# Patient Record
Sex: FEMALE | Race: BLACK OR AFRICAN AMERICAN | Employment: OTHER
[De-identification: names, ages, dates, MRNs, and addresses within clinical notes are randomized per-mention and may not be internally consistent; named-entity substitution may affect disease eponyms.]

---

## 2024-07-17 RX ORDER — ASPIRIN 81 MG/1
81 TABLET ORAL DAILY
COMMUNITY

## 2024-07-17 RX ORDER — EZETIMIBE 10 MG/1
10 TABLET ORAL DAILY
COMMUNITY

## 2024-07-17 RX ORDER — OMEPRAZOLE 20 MG/1
20 CAPSULE, DELAYED RELEASE ORAL DAILY
COMMUNITY

## 2024-07-17 RX ORDER — M-VIT,TX,IRON,MINS/CALC/FOLIC 27MG-0.4MG
1 TABLET ORAL DAILY
COMMUNITY

## 2024-07-17 RX ORDER — ATORVASTATIN CALCIUM 80 MG/1
80 TABLET, FILM COATED ORAL DAILY
COMMUNITY

## 2024-07-18 ENCOUNTER — HOSPITAL ENCOUNTER (OUTPATIENT)
Facility: HOSPITAL | Age: 74
Discharge: HOME | End: 2024-07-18
Payer: MEDICARE

## 2024-07-18 VITALS
OXYGEN SATURATION: 100 % | HEIGHT: 61 IN | SYSTOLIC BLOOD PRESSURE: 135 MMHG | DIASTOLIC BLOOD PRESSURE: 78 MMHG | TEMPERATURE: 97.7 F | RESPIRATION RATE: 18 BRPM | WEIGHT: 169.75 LBS | BODY MASS INDEX: 32.05 KG/M2 | HEART RATE: 68 BPM

## 2024-07-18 LAB
ABO + RH BLD: NORMAL
ALBUMIN SERPL-MCNC: 4 G/DL (ref 3.5–5)
ALBUMIN/GLOB SERPL: 1.1 (ref 1.1–2.2)
ALP SERPL-CCNC: 90 U/L (ref 45–117)
ALT SERPL-CCNC: 34 U/L (ref 12–78)
ANION GAP SERPL CALC-SCNC: 4 MMOL/L (ref 5–15)
APPEARANCE UR: CLEAR
APTT PPP: 24.6 SEC (ref 22.1–31)
AST SERPL-CCNC: 40 U/L (ref 15–37)
BACTERIA URNS QL MICRO: NEGATIVE /HPF
BASOPHILS # BLD: 0 K/UL (ref 0–0.1)
BASOPHILS NFR BLD: 1 % (ref 0–1)
BILIRUB SERPL-MCNC: 0.6 MG/DL (ref 0.2–1)
BILIRUB UR QL: NEGATIVE
BLOOD GROUP ANTIBODIES SERPL: NORMAL
BUN SERPL-MCNC: 16 MG/DL (ref 6–20)
BUN/CREAT SERPL: 20 (ref 12–20)
CALCIUM SERPL-MCNC: 10 MG/DL (ref 8.5–10.1)
CHLORIDE SERPL-SCNC: 110 MMOL/L (ref 97–108)
CO2 SERPL-SCNC: 25 MMOL/L (ref 21–32)
COLOR UR: ABNORMAL
CREAT SERPL-MCNC: 0.82 MG/DL (ref 0.55–1.02)
DIFFERENTIAL METHOD BLD: ABNORMAL
EKG ATRIAL RATE: 52 BPM
EKG DIAGNOSIS: NORMAL
EKG P AXIS: 19 DEGREES
EKG P-R INTERVAL: 174 MS
EKG Q-T INTERVAL: 440 MS
EKG QRS DURATION: 78 MS
EKG QTC CALCULATION (BAZETT): 409 MS
EKG R AXIS: -11 DEGREES
EKG T AXIS: 151 DEGREES
EKG VENTRICULAR RATE: 52 BPM
EOSINOPHIL # BLD: 0.1 K/UL (ref 0–0.4)
EOSINOPHIL NFR BLD: 2 % (ref 0–7)
EPITH CASTS URNS QL MICRO: ABNORMAL /LPF
ERYTHROCYTE [DISTWIDTH] IN BLOOD BY AUTOMATED COUNT: 13.6 % (ref 11.5–14.5)
EST. AVERAGE GLUCOSE BLD GHB EST-MCNC: 108 MG/DL
GLOBULIN SER CALC-MCNC: 3.6 G/DL (ref 2–4)
GLUCOSE SERPL-MCNC: 88 MG/DL (ref 65–100)
GLUCOSE UR STRIP.AUTO-MCNC: NEGATIVE MG/DL
HBA1C MFR BLD: 5.4 % (ref 4–5.6)
HCT VFR BLD AUTO: 39.9 % (ref 35–47)
HGB BLD-MCNC: 12.6 G/DL (ref 11.5–16)
HGB UR QL STRIP: NEGATIVE
IMM GRANULOCYTES # BLD AUTO: 0 K/UL (ref 0–0.04)
IMM GRANULOCYTES NFR BLD AUTO: 0 % (ref 0–0.5)
INR PPP: 1 (ref 0.9–1.1)
KETONES UR QL STRIP.AUTO: NEGATIVE MG/DL
LEUKOCYTE ESTERASE UR QL STRIP.AUTO: ABNORMAL
LYMPHOCYTES # BLD: 2.2 K/UL (ref 0.8–3.5)
LYMPHOCYTES NFR BLD: 52 % (ref 12–49)
MCH RBC QN AUTO: 29 PG (ref 26–34)
MCHC RBC AUTO-ENTMCNC: 31.6 G/DL (ref 30–36.5)
MCV RBC AUTO: 91.7 FL (ref 80–99)
MONOCYTES # BLD: 0.4 K/UL (ref 0–1)
MONOCYTES NFR BLD: 9 % (ref 5–13)
NEUTS SEG # BLD: 1.5 K/UL (ref 1.8–8)
NEUTS SEG NFR BLD: 36 % (ref 32–75)
NITRITE UR QL STRIP.AUTO: NEGATIVE
NRBC # BLD: 0 K/UL (ref 0–0.01)
NRBC BLD-RTO: 0 PER 100 WBC
PH UR STRIP: 6 (ref 5–8)
PLATELET # BLD AUTO: 232 K/UL (ref 150–400)
PMV BLD AUTO: 9.7 FL (ref 8.9–12.9)
POTASSIUM SERPL-SCNC: 3.9 MMOL/L (ref 3.5–5.1)
PROT SERPL-MCNC: 7.6 G/DL (ref 6.4–8.2)
PROT UR STRIP-MCNC: NEGATIVE MG/DL
PROTHROMBIN TIME: 10.5 SEC (ref 9–11.1)
RBC # BLD AUTO: 4.35 M/UL (ref 3.8–5.2)
RBC #/AREA URNS HPF: ABNORMAL /HPF (ref 0–5)
SODIUM SERPL-SCNC: 139 MMOL/L (ref 136–145)
SP GR UR REFRACTOMETRY: 1.01
SPECIMEN EXP DATE BLD: NORMAL
THERAPEUTIC RANGE: NORMAL SECS (ref 58–77)
URINE CULTURE IF INDICATED: ABNORMAL
UROBILINOGEN UR QL STRIP.AUTO: 1 EU/DL (ref 0.2–1)
WBC # BLD AUTO: 4.3 K/UL (ref 3.6–11)
WBC URNS QL MICRO: ABNORMAL /HPF (ref 0–4)

## 2024-07-18 PROCEDURE — 86850 RBC ANTIBODY SCREEN: CPT

## 2024-07-18 PROCEDURE — 86901 BLOOD TYPING SEROLOGIC RH(D): CPT

## 2024-07-18 PROCEDURE — 97530 THERAPEUTIC ACTIVITIES: CPT

## 2024-07-18 PROCEDURE — 83036 HEMOGLOBIN GLYCOSYLATED A1C: CPT

## 2024-07-18 PROCEDURE — 36415 COLL VENOUS BLD VENIPUNCTURE: CPT

## 2024-07-18 PROCEDURE — 97161 PT EVAL LOW COMPLEX 20 MIN: CPT

## 2024-07-18 PROCEDURE — 80053 COMPREHEN METABOLIC PANEL: CPT

## 2024-07-18 PROCEDURE — 86900 BLOOD TYPING SEROLOGIC ABO: CPT

## 2024-07-18 PROCEDURE — 85610 PROTHROMBIN TIME: CPT

## 2024-07-18 PROCEDURE — APPNB30 APP NON BILLABLE TIME 0-30 MINS: Performed by: NURSE PRACTITIONER

## 2024-07-18 PROCEDURE — 85025 COMPLETE CBC W/AUTO DIFF WBC: CPT

## 2024-07-18 PROCEDURE — 81001 URINALYSIS AUTO W/SCOPE: CPT

## 2024-07-18 PROCEDURE — 93005 ELECTROCARDIOGRAM TRACING: CPT | Performed by: NEUROLOGICAL SURGERY

## 2024-07-18 PROCEDURE — 85730 THROMBOPLASTIN TIME PARTIAL: CPT

## 2024-07-18 RX ORDER — SODIUM CHLORIDE, SODIUM LACTATE, POTASSIUM CHLORIDE, CALCIUM CHLORIDE 600; 310; 30; 20 MG/100ML; MG/100ML; MG/100ML; MG/100ML
INJECTION, SOLUTION INTRAVENOUS CONTINUOUS
Status: CANCELLED | OUTPATIENT
Start: 2024-07-24

## 2024-07-18 RX ORDER — ACETAMINOPHEN 500 MG
1000 TABLET ORAL ONCE
Status: CANCELLED | OUTPATIENT
Start: 2024-07-24

## 2024-07-18 RX ORDER — PREGABALIN 150 MG/1
150 CAPSULE ORAL ONCE
Status: CANCELLED | OUTPATIENT
Start: 2024-07-24

## 2024-07-18 RX ORDER — CALCIUM CARBONATE 500(1250)
500 TABLET ORAL DAILY
COMMUNITY

## 2024-07-18 ASSESSMENT — PAIN DESCRIPTION - ORIENTATION: ORIENTATION: LEFT;RIGHT

## 2024-07-18 ASSESSMENT — PAIN DESCRIPTION - LOCATION: LOCATION: BACK;LEG

## 2024-07-18 ASSESSMENT — PAIN DESCRIPTION - DESCRIPTORS: DESCRIPTORS: ACHING;NUMBNESS;SHOOTING

## 2024-07-18 ASSESSMENT — PAIN DESCRIPTION - FREQUENCY: FREQUENCY: CONTINUOUS

## 2024-07-18 ASSESSMENT — PAIN DESCRIPTION - ONSET: ONSET: AWAKENED FROM SLEEP

## 2024-07-18 ASSESSMENT — PAIN SCALES - GENERAL: PAINLEVEL_OUTOF10: 3

## 2024-07-19 LAB
BACTERIA SPEC CULT: NORMAL
BACTERIA SPEC CULT: NORMAL
SERVICE CMNT-IMP: NORMAL

## 2024-07-22 NOTE — PROGRESS NOTES
[facility]  Joint/Spine Preoperative Instructions        Surgery Date 07/24/24          Time of Arrival to be called on 07/23/24 after 2 pm  Contact: [phone]   1. On the day of your surgery, please report to the Surgical Services Registration Desk and sign in at your designated time. The Surgery Center is located to the right of the Emergency Room.     2. You must have someone with you to drive you home. You should not drive a car for 24 hours following surgery. Please make arrangements for a friend or family member to stay with you for the first 24 hours after your surgery.    3. No food after midnight 07/23/24.  Medications morning of surgery should be taken with a sip of water.  Please follow pre-surgery drink instructions that were given at your Pre Admission Testing appointment.      4. We recommend you do not drink any alcoholic beverages for 24 hours before and after your surgery.    5. Contact your surgeon’s office for instructions on the following medications: non-steroidal anti-inflammatory drugs (i.e. Advil, Aleve), vitamins, and supplements. (Some surgeon’s will want you to stop these medications prior to surgery and others may allow you to take them)  **If you are currently taking Plavix, Coumadin, Aspirin and/or other blood-thinning agents, contact your surgeon for instructions.** Your surgeon will partner with the physician prescribing these medications to determine if it is safe to stop or if you need to continue taking.  Please do not stop taking these medications without instructions from your surgeon    6. Wear comfortable clothes.  Wear glasses instead of contacts.  Do not bring any money or jewelry. Please bring picture ID, insurance card, and any prearranged co-payment or hospital payment.  Do not wear make-up, particularly mascara the morning of your surgery.  Do not wear nail polish, particularly if you are having foot /hand surgery.  Wear your hair 
Hibiclens/Chlorhexidine    Preventing Infections Before and After - Your Surgery    IMPORTANT INSTRUCTIONS    Please read and follow these instructions carefully. If you are unable to comply with the below instructions your procedure will be cancelled.       Every Night for Three (3) nights before your surgery:  Shower with an antibacterial soap, such as Dial, or the soap provided at your preassessment appointment. A shower is better than a bath for cleaning your skin.  If needed, ask someone to help you reach all areas of your body. Don’t forget to clean your belly button with every shower.    The night before your surgery:   If you lose your Hibiclens/chlorhexidine please contact surgery center or you can purchase it at a local pharmacy  On the night before your surgery, shower with an antibacterial soap, such as Dial, or the soap provided at your preassessment appointment.   With bottle of Hibiclens/Chlorhexidine in hand, turn water off.  Apply Hibiclens antiseptic skin cleanser with a clean, freshly washed washcloth.  Gently apply to your body from chin to toes (except the genital area) and especially the area(s) where your incision(s) will be.  Leave Hibiclens/Chlorhexidine on your skin for at least 20 seconds.    CAUTION: If needed, Hibiclens/chlorhexidine may be used to clean the folds of skin of the legs (such as in the area of the groin) and on your buttocks and hips. However, do not use Hibiclens/Chlorhexidine above the neck or in the genital area (your bottom) or put inside any area of your body.  Turn the water back on and rinse.  Dry gently with a clean, freshly washed towel.  After your shower, do not use any powder, deodorant, perfumes or lotion.  Use clean, freshly washed towels and washcloths every time you shower.  Wear clean, freshly washed pajamas to bed the night before surgery.  Sleep on clean, freshly washed sheets.  Do not allow pets to sleep in your bed with you.        The Morning of your 
Incentive Spirometer        Using the incentive spirometer helps expand the small air sacs of your lungs, helps you breathe deeply, and helps improve your lung function.  Use your incentive spirometer twice a day (10 breaths each time) prior to surgery.      How to Use Your Incentive Spirometer:  Hold the incentive spirometer in an upright position.   Breathe out as usual.   Place the mouthpiece in your mouth and seal your lips tightly around it.   Take a deep breath.  Breathe in slowly and as deeply as possible. Keep the blue flow rate guide between the arrows.   Hold your breath as long as possible. Then exhale slowly and allow the piston to fall to the bottom of the column.   Rest for a few seconds and repeat steps one through five at least 10 times.     PAT Tidal Volume________2000-2500__________  x_______2_________  Date________7/18/24_______________    BRING THE INCENTIVE SPIROMETER WITH YOU TO THE HOSPITAL ON THE DAY OF YOUR SURGERY.  Opportunity given to ask and answer questions as well as to observe return demonstration.    Patient signature_____________________________    Witness____________________________  
Orthopedic and Spine Patients:  Instructions on When You Can   Eat or Drink Before Surgery      You have been provided 2 pre-surgery drinks received at your pre-admission testing appointment.    Night before surgery:  You should drink one bottle of the  pre-surgery drink at bedtime. No food after midnight!    Day of Surgery:  Complete 2nd bottle of the pre-surgery drink 1 hour prior to arrival at hospital.  For questions call Pre-Admission Testing at [phone].  They are available from 8:00am-5:00pm, Monday through Friday.  
Spine book given and reviewed with patient and opportunity provided to  answer questions  
  ____________________________________________  PAST SURGICAL HISTORY  Past Surgical History:   Procedure Laterality Date    DILATION AND CURETTAGE OF UTERUS      for bleeding for 3 months    LAP BAND      placed and removed      ____________________________________________  HOME MEDICATIONS    Current Outpatient Medications   Medication Sig    linaclotide (LINZESS) 145 MCG capsule Take 1 capsule by mouth as needed    vitamin D 25 MCG (1000 UT) CAPS Take by mouth daily    calcium carbonate (OSCAL) 500 MG TABS tablet Take 1 tablet by mouth daily    Multiple Vitamins-Minerals (THERAPEUTIC MULTIVITAMIN-MINERALS) tablet Take 1 tablet by mouth daily    omeprazole (PRILOSEC) 20 MG delayed release capsule Take 1 capsule by mouth daily    aspirin 81 MG EC tablet Take 1 tablet by mouth daily    ezetimibe (ZETIA) 10 MG tablet Take 1 tablet by mouth daily    atorvastatin (LIPITOR) 80 MG tablet Take 1 tablet by mouth daily     No current facility-administered medications for this encounter.      ____________________________________________  ALLERGIES  No Known Allergies   ____________________________________________  SOCIAL HISTORY  Social History     Tobacco Use    Smoking status: Former     Types: Cigarettes     Start date:      Quit date: 1970     Years since quittin.5    Smokeless tobacco: Never   Substance Use Topics    Alcohol use: Yes     Comment: occasional      ____________________________________________       Labs:     Hospital Outpatient Visit on 2024   Component Date Value Ref Range Status    WBC 2024 4.3  3.6 - 11.0 K/uL Final    RBC 2024 4.35  3.80 - 5.20 M/uL Final    Hemoglobin 2024 12.6  11.5 - 16.0 g/dL Final    Hematocrit 2024 39.9  35.0 - 47.0 % Final    MCV 2024 91.7  80.0 - 99.0 FL Final    MCH 2024 29.0  26.0 - 34.0 PG Final    MCHC 2024 31.6  30.0 - 36.5 g/dL Final    RDW 2024 13.6  11.5 - 14.5 % Final    Platelets 2024 232  150 - 400 
buttocks, radiates to knees     Activity Tolerance:   Good    Patient does not state signs/symptoms of shortness of breath/dyspnea on exertion/respiratory distress.  COMMUNICATION OF ABOVE EDUCATION:     The patient's plan of care was discussed as follows:   [x]         The patient verbalized understanding of her plan in preparation for their upcoming surgery  []         The patient's  was present for this session  []        The patient reports that he/she does not have a  identified at this time  []         The  verbalized understanding of the education regarding the patient's upcoming surgery  [x]         Patient/family agree to work toward stated goals and plan of care.  []         Patient understands intent and goals of therapy, but is neutral about his/her participation.  []         Patient is unable to participate in goal setting and plan of care.      Thank you for this referral.  Radha De Leon, PT      Time  In / Out       Total Treatment Time     Total Timed Codes     1:1 Treatment Time        [facility] Totals Reminder:  bill using total billable   min of TIMED therapeutic procedures and modalities.   8-22 min = 1 unit; 23-37 min = 2 units; 38-52 min = 3 units;  53-67 min = 4 units; 68-82 min = 5 units     Patient  verified yes     Visit #   Current  / Total 1 1         Physical Therapy Evaluation Charge Determination   History Examination Presentation Decision-Making   MEDIUM  Complexity : 1-2 comorbidities / personal factors will impact the outcome/ POC  MEDIUM Complexity : 3 Standardized tests and measures addressin body structure, function, activity limitation and / or participation in recreation  LOW Complexity : Stable, uncomplicated  AM-PAC  LOW      Based on the above components, the patient evaluation is determined to be of the following complexity level: Low

## 2024-07-23 NOTE — DISCHARGE INSTRUCTIONS
SPINE DISCHARGE  INSTRUCTIONS    Antonio Mobley M.D.      What can I do?  The only exercise you should do is walking.  Start by walking twice a day for 5 minutes, then increase by  2-3 minutes every day until you reach 25 minutes twice a day.  DO NOT sit for long periods of time (20 minutes at a time for the first couple of weeks, then gradually increase.  No heavy lifting (5 lbs max); no straining, twisting, or bending.  No driving until your physician tells you it is ok.  It is, however, ok to ride short distances in a car.  If you are required to wear a back brace, you may remove it when you are sleeping unless your doctor has advised against it.  If you are required to wear a cervical collar, you must sleep in it.  You can remove it only for showers.    What can I eat?   You may resume your regular home diet as tolerated.  If your throat is sore, you may want to eat soft food for the first few days.    When can I take a shower?  You may shower leaving on your occlusive (waterproof) dressing on allowing water to run over the dressing.  The dressing may be removed in 5 days.  The small pieces of tape (steri strips)  underneath it should stay on.  Let water run over them, then pat dry gently.  Do not take baths or soak in pools.  You may remove your brace for showering.    Medications:  Check with your physician before taking any anti-inflammatory medicines (Advil, Aleve, ibuprofen, aspirin).  Take prescription medicine as directed.  DO NOT take more than prescribed.  Call your physician if the prescribed dose is not sufficiently controlling your pain.  It is important to have regular bowel movements.  Pain medications may cause constipation.  Drink plenty of fluids, get enough fiber in your diet, and use stool softeners and drink prune juice to help prevent constipation.  Do not take laxatives if at all possible except in severe situations.  It can result in a vicious cycle of constipation and diarrhea.  Do not put

## 2024-07-24 ENCOUNTER — ANESTHESIA EVENT (OUTPATIENT)
Facility: HOSPITAL | Age: 74
End: 2024-07-24
Payer: MEDICARE

## 2024-07-24 ENCOUNTER — APPOINTMENT (OUTPATIENT)
Facility: HOSPITAL | Age: 74
End: 2024-07-24
Attending: NEUROLOGICAL SURGERY
Payer: MEDICARE

## 2024-07-24 ENCOUNTER — HOSPITAL ENCOUNTER (OUTPATIENT)
Facility: HOSPITAL | Age: 74
Setting detail: OBSERVATION
Discharge: HOME HEALTH | End: 2024-07-26
Attending: NEUROLOGICAL SURGERY | Admitting: NEUROLOGICAL SURGERY
Payer: MEDICARE

## 2024-07-24 ENCOUNTER — ANESTHESIA (OUTPATIENT)
Facility: HOSPITAL | Age: 74
End: 2024-07-24
Payer: MEDICARE

## 2024-07-24 DIAGNOSIS — M48.062 LUMBAR STENOSIS WITH NEUROGENIC CLAUDICATION: Primary | ICD-10-CM

## 2024-07-24 PROCEDURE — C1713 ANCHOR/SCREW BN/BN,TIS/BN: HCPCS | Performed by: NEUROLOGICAL SURGERY

## 2024-07-24 PROCEDURE — 6360000002 HC RX W HCPCS: Performed by: NEUROLOGICAL SURGERY

## 2024-07-24 PROCEDURE — 2500000003 HC RX 250 WO HCPCS: Performed by: NURSE ANESTHETIST, CERTIFIED REGISTERED

## 2024-07-24 PROCEDURE — 94760 N-INVAS EAR/PLS OXIMETRY 1: CPT

## 2024-07-24 PROCEDURE — 2720000010 HC SURG SUPPLY STERILE: Performed by: NEUROLOGICAL SURGERY

## 2024-07-24 PROCEDURE — 2580000003 HC RX 258: Performed by: STUDENT IN AN ORGANIZED HEALTH CARE EDUCATION/TRAINING PROGRAM

## 2024-07-24 PROCEDURE — 7100000000 HC PACU RECOVERY - FIRST 15 MIN: Performed by: NEUROLOGICAL SURGERY

## 2024-07-24 PROCEDURE — 6360000002 HC RX W HCPCS: Performed by: NURSE ANESTHETIST, CERTIFIED REGISTERED

## 2024-07-24 PROCEDURE — 2700000000 HC OXYGEN THERAPY PER DAY

## 2024-07-24 PROCEDURE — G0378 HOSPITAL OBSERVATION PER HR: HCPCS

## 2024-07-24 PROCEDURE — 6370000000 HC RX 637 (ALT 250 FOR IP): Performed by: NEUROLOGICAL SURGERY

## 2024-07-24 PROCEDURE — 2580000003 HC RX 258: Performed by: NEUROLOGICAL SURGERY

## 2024-07-24 PROCEDURE — 6360000002 HC RX W HCPCS: Performed by: ANESTHESIOLOGY

## 2024-07-24 PROCEDURE — 3600000014 HC SURGERY LEVEL 4 ADDTL 15MIN: Performed by: NEUROLOGICAL SURGERY

## 2024-07-24 PROCEDURE — 3700000001 HC ADD 15 MINUTES (ANESTHESIA): Performed by: NEUROLOGICAL SURGERY

## 2024-07-24 PROCEDURE — C9359 IMPLNT,BON VOID FILLER-PUTTY: HCPCS | Performed by: NEUROLOGICAL SURGERY

## 2024-07-24 PROCEDURE — 2709999900 HC NON-CHARGEABLE SUPPLY: Performed by: NEUROLOGICAL SURGERY

## 2024-07-24 PROCEDURE — 3600000004 HC SURGERY LEVEL 4 BASE: Performed by: NEUROLOGICAL SURGERY

## 2024-07-24 PROCEDURE — 2500000003 HC RX 250 WO HCPCS: Performed by: NEUROLOGICAL SURGERY

## 2024-07-24 PROCEDURE — 3700000000 HC ANESTHESIA ATTENDED CARE: Performed by: NEUROLOGICAL SURGERY

## 2024-07-24 PROCEDURE — 7100000001 HC PACU RECOVERY - ADDTL 15 MIN: Performed by: NEUROLOGICAL SURGERY

## 2024-07-24 PROCEDURE — 2580000003 HC RX 258: Performed by: NURSE ANESTHETIST, CERTIFIED REGISTERED

## 2024-07-24 DEVICE — DBM T43110 10CC GRAFTON PUTTY
Type: IMPLANTABLE DEVICE | Site: SPINE LUMBAR | Status: FUNCTIONAL
Brand: GRAFTON®AND GRAFTON PLUS®DEMINERALIZED BONE MATRIX (DBM)

## 2024-07-24 DEVICE — ROD 1553201040 5.5 TI CP4 NS CURV 40MM
Type: IMPLANTABLE DEVICE | Site: SPINE LUMBAR | Status: FUNCTIONAL
Brand: CD HORIZON® SPINAL SYSTEM

## 2024-07-24 RX ORDER — SENNA AND DOCUSATE SODIUM 50; 8.6 MG/1; MG/1
1 TABLET, FILM COATED ORAL 2 TIMES DAILY
Status: DISCONTINUED | OUTPATIENT
Start: 2024-07-24 | End: 2024-07-26 | Stop reason: HOSPADM

## 2024-07-24 RX ORDER — DEXAMETHASONE SODIUM PHOSPHATE 4 MG/ML
4 INJECTION, SOLUTION INTRA-ARTICULAR; INTRALESIONAL; INTRAMUSCULAR; INTRAVENOUS; SOFT TISSUE
Status: DISCONTINUED | OUTPATIENT
Start: 2024-07-24 | End: 2024-07-24 | Stop reason: HOSPADM

## 2024-07-24 RX ORDER — SODIUM CHLORIDE 0.9 % (FLUSH) 0.9 %
5-40 SYRINGE (ML) INJECTION PRN
Status: DISCONTINUED | OUTPATIENT
Start: 2024-07-24 | End: 2024-07-24 | Stop reason: HOSPADM

## 2024-07-24 RX ORDER — EZETIMIBE 10 MG/1
10 TABLET ORAL DAILY
Status: DISCONTINUED | OUTPATIENT
Start: 2024-07-24 | End: 2024-07-26 | Stop reason: HOSPADM

## 2024-07-24 RX ORDER — ACETAMINOPHEN 325 MG/1
650 TABLET ORAL EVERY 6 HOURS
Status: DISCONTINUED | OUTPATIENT
Start: 2024-07-24 | End: 2024-07-26 | Stop reason: HOSPADM

## 2024-07-24 RX ORDER — LIDOCAINE HYDROCHLORIDE AND EPINEPHRINE 10; 10 MG/ML; UG/ML
INJECTION, SOLUTION INFILTRATION; PERINEURAL PRN
Status: DISCONTINUED | OUTPATIENT
Start: 2024-07-24 | End: 2024-07-24 | Stop reason: ALTCHOICE

## 2024-07-24 RX ORDER — ACETAMINOPHEN 325 MG/1
650 TABLET ORAL
Status: DISCONTINUED | OUTPATIENT
Start: 2024-07-24 | End: 2024-07-24 | Stop reason: HOSPADM

## 2024-07-24 RX ORDER — MORPHINE SULFATE 2 MG/ML
2 INJECTION, SOLUTION INTRAMUSCULAR; INTRAVENOUS
Status: ACTIVE | OUTPATIENT
Start: 2024-07-24 | End: 2024-07-25

## 2024-07-24 RX ORDER — ATORVASTATIN CALCIUM 40 MG/1
80 TABLET, FILM COATED ORAL DAILY
Status: DISCONTINUED | OUTPATIENT
Start: 2024-07-24 | End: 2024-07-26 | Stop reason: HOSPADM

## 2024-07-24 RX ORDER — PREGABALIN 150 MG/1
150 CAPSULE ORAL ONCE
Status: COMPLETED | OUTPATIENT
Start: 2024-07-24 | End: 2024-07-24

## 2024-07-24 RX ORDER — OXYCODONE HYDROCHLORIDE 5 MG/1
5 TABLET ORAL
Status: DISCONTINUED | OUTPATIENT
Start: 2024-07-24 | End: 2024-07-26 | Stop reason: HOSPADM

## 2024-07-24 RX ORDER — HYDROMORPHONE HYDROCHLORIDE 2 MG/ML
INJECTION, SOLUTION INTRAMUSCULAR; INTRAVENOUS; SUBCUTANEOUS PRN
Status: DISCONTINUED | OUTPATIENT
Start: 2024-07-24 | End: 2024-07-24 | Stop reason: SDUPTHER

## 2024-07-24 RX ORDER — DEXAMETHASONE SODIUM PHOSPHATE 4 MG/ML
INJECTION, SOLUTION INTRA-ARTICULAR; INTRALESIONAL; INTRAMUSCULAR; INTRAVENOUS; SOFT TISSUE PRN
Status: DISCONTINUED | OUTPATIENT
Start: 2024-07-24 | End: 2024-07-24 | Stop reason: SDUPTHER

## 2024-07-24 RX ORDER — SUCCINYLCHOLINE CHLORIDE 20 MG/ML
INJECTION INTRAMUSCULAR; INTRAVENOUS PRN
Status: DISCONTINUED | OUTPATIENT
Start: 2024-07-24 | End: 2024-07-24 | Stop reason: SDUPTHER

## 2024-07-24 RX ORDER — ONDANSETRON 2 MG/ML
4 INJECTION INTRAMUSCULAR; INTRAVENOUS EVERY 6 HOURS PRN
Status: DISCONTINUED | OUTPATIENT
Start: 2024-07-24 | End: 2024-07-26 | Stop reason: HOSPADM

## 2024-07-24 RX ORDER — NALOXONE HYDROCHLORIDE 0.4 MG/ML
INJECTION, SOLUTION INTRAMUSCULAR; INTRAVENOUS; SUBCUTANEOUS PRN
Status: DISCONTINUED | OUTPATIENT
Start: 2024-07-24 | End: 2024-07-24 | Stop reason: HOSPADM

## 2024-07-24 RX ORDER — ROCURONIUM BROMIDE 10 MG/ML
INJECTION, SOLUTION INTRAVENOUS PRN
Status: DISCONTINUED | OUTPATIENT
Start: 2024-07-24 | End: 2024-07-24 | Stop reason: SDUPTHER

## 2024-07-24 RX ORDER — HYDROMORPHONE HYDROCHLORIDE 1 MG/ML
0.25 INJECTION, SOLUTION INTRAMUSCULAR; INTRAVENOUS; SUBCUTANEOUS EVERY 5 MIN PRN
Status: DISCONTINUED | OUTPATIENT
Start: 2024-07-24 | End: 2024-07-24 | Stop reason: HOSPADM

## 2024-07-24 RX ORDER — SODIUM CHLORIDE 0.9 % (FLUSH) 0.9 %
5-40 SYRINGE (ML) INJECTION EVERY 12 HOURS SCHEDULED
Status: DISCONTINUED | OUTPATIENT
Start: 2024-07-24 | End: 2024-07-24 | Stop reason: HOSPADM

## 2024-07-24 RX ORDER — PROCHLORPERAZINE EDISYLATE 5 MG/ML
5 INJECTION INTRAMUSCULAR; INTRAVENOUS
Status: DISCONTINUED | OUTPATIENT
Start: 2024-07-24 | End: 2024-07-24 | Stop reason: HOSPADM

## 2024-07-24 RX ORDER — ONDANSETRON 2 MG/ML
INJECTION INTRAMUSCULAR; INTRAVENOUS PRN
Status: DISCONTINUED | OUTPATIENT
Start: 2024-07-24 | End: 2024-07-24 | Stop reason: SDUPTHER

## 2024-07-24 RX ORDER — SODIUM CHLORIDE, SODIUM LACTATE, POTASSIUM CHLORIDE, CALCIUM CHLORIDE 600; 310; 30; 20 MG/100ML; MG/100ML; MG/100ML; MG/100ML
INJECTION, SOLUTION INTRAVENOUS CONTINUOUS
Status: DISCONTINUED | OUTPATIENT
Start: 2024-07-24 | End: 2024-07-24 | Stop reason: HOSPADM

## 2024-07-24 RX ORDER — FENTANYL CITRATE 50 UG/ML
100 INJECTION, SOLUTION INTRAMUSCULAR; INTRAVENOUS
Status: DISCONTINUED | OUTPATIENT
Start: 2024-07-24 | End: 2024-07-24 | Stop reason: HOSPADM

## 2024-07-24 RX ORDER — SODIUM CHLORIDE 9 MG/ML
INJECTION, SOLUTION INTRAVENOUS CONTINUOUS
Status: DISCONTINUED | OUTPATIENT
Start: 2024-07-24 | End: 2024-07-26 | Stop reason: HOSPADM

## 2024-07-24 RX ORDER — FENTANYL CITRATE 50 UG/ML
25 INJECTION, SOLUTION INTRAMUSCULAR; INTRAVENOUS EVERY 5 MIN PRN
Status: DISCONTINUED | OUTPATIENT
Start: 2024-07-24 | End: 2024-07-24 | Stop reason: HOSPADM

## 2024-07-24 RX ORDER — MIDAZOLAM HYDROCHLORIDE 1 MG/ML
INJECTION INTRAMUSCULAR; INTRAVENOUS PRN
Status: DISCONTINUED | OUTPATIENT
Start: 2024-07-24 | End: 2024-07-24 | Stop reason: SDUPTHER

## 2024-07-24 RX ORDER — SODIUM CHLORIDE 9 MG/ML
INJECTION, SOLUTION INTRAVENOUS PRN
Status: DISCONTINUED | OUTPATIENT
Start: 2024-07-24 | End: 2024-07-24 | Stop reason: HOSPADM

## 2024-07-24 RX ORDER — HYDRALAZINE HYDROCHLORIDE 20 MG/ML
10 INJECTION INTRAMUSCULAR; INTRAVENOUS
Status: DISCONTINUED | OUTPATIENT
Start: 2024-07-24 | End: 2024-07-24 | Stop reason: HOSPADM

## 2024-07-24 RX ORDER — PANTOPRAZOLE SODIUM 40 MG/1
40 TABLET, DELAYED RELEASE ORAL
Status: DISCONTINUED | OUTPATIENT
Start: 2024-07-25 | End: 2024-07-26 | Stop reason: HOSPADM

## 2024-07-24 RX ORDER — FENTANYL CITRATE 50 UG/ML
INJECTION, SOLUTION INTRAMUSCULAR; INTRAVENOUS PRN
Status: DISCONTINUED | OUTPATIENT
Start: 2024-07-24 | End: 2024-07-24 | Stop reason: SDUPTHER

## 2024-07-24 RX ORDER — POLYETHYLENE GLYCOL 3350 17 G/17G
17 POWDER, FOR SOLUTION ORAL DAILY
Status: DISCONTINUED | OUTPATIENT
Start: 2024-07-24 | End: 2024-07-26 | Stop reason: HOSPADM

## 2024-07-24 RX ORDER — ONDANSETRON 2 MG/ML
4 INJECTION INTRAMUSCULAR; INTRAVENOUS ONCE
Status: COMPLETED | OUTPATIENT
Start: 2024-07-24 | End: 2024-07-24

## 2024-07-24 RX ORDER — OXYCODONE HYDROCHLORIDE 5 MG/1
5 TABLET ORAL
Status: DISCONTINUED | OUTPATIENT
Start: 2024-07-24 | End: 2024-07-24 | Stop reason: HOSPADM

## 2024-07-24 RX ORDER — ACETAMINOPHEN 500 MG
1000 TABLET ORAL ONCE
Status: DISCONTINUED | OUTPATIENT
Start: 2024-07-24 | End: 2024-07-24 | Stop reason: HOSPADM

## 2024-07-24 RX ORDER — CYCLOBENZAPRINE HCL 10 MG
10 TABLET ORAL EVERY 12 HOURS PRN
Status: DISCONTINUED | OUTPATIENT
Start: 2024-07-24 | End: 2024-07-26

## 2024-07-24 RX ORDER — LIDOCAINE HYDROCHLORIDE 20 MG/ML
INJECTION, SOLUTION EPIDURAL; INFILTRATION; INTRACAUDAL; PERINEURAL PRN
Status: DISCONTINUED | OUTPATIENT
Start: 2024-07-24 | End: 2024-07-24 | Stop reason: SDUPTHER

## 2024-07-24 RX ORDER — MIDAZOLAM HYDROCHLORIDE 2 MG/2ML
2 INJECTION, SOLUTION INTRAMUSCULAR; INTRAVENOUS
Status: DISCONTINUED | OUTPATIENT
Start: 2024-07-24 | End: 2024-07-24 | Stop reason: HOSPADM

## 2024-07-24 RX ORDER — ACETAMINOPHEN 500 MG
1000 TABLET ORAL ONCE
Status: COMPLETED | OUTPATIENT
Start: 2024-07-24 | End: 2024-07-24

## 2024-07-24 RX ORDER — OXYCODONE HYDROCHLORIDE 5 MG/1
10 TABLET ORAL
Status: DISCONTINUED | OUTPATIENT
Start: 2024-07-24 | End: 2024-07-26 | Stop reason: HOSPADM

## 2024-07-24 RX ORDER — ONDANSETRON 4 MG/1
4 TABLET, ORALLY DISINTEGRATING ORAL EVERY 8 HOURS PRN
Status: DISCONTINUED | OUTPATIENT
Start: 2024-07-24 | End: 2024-07-26 | Stop reason: HOSPADM

## 2024-07-24 RX ORDER — SODIUM CHLORIDE 0.9 % (FLUSH) 0.9 %
5-40 SYRINGE (ML) INJECTION EVERY 12 HOURS SCHEDULED
Status: DISCONTINUED | OUTPATIENT
Start: 2024-07-24 | End: 2024-07-26 | Stop reason: HOSPADM

## 2024-07-24 RX ORDER — SODIUM CHLORIDE 0.9 % (FLUSH) 0.9 %
5-40 SYRINGE (ML) INJECTION PRN
Status: DISCONTINUED | OUTPATIENT
Start: 2024-07-24 | End: 2024-07-26 | Stop reason: HOSPADM

## 2024-07-24 RX ADMIN — WATER 2000 MG: 1 INJECTION INTRAMUSCULAR; INTRAVENOUS; SUBCUTANEOUS at 14:48

## 2024-07-24 RX ADMIN — DEXAMETHASONE SODIUM PHOSPHATE 4 MG: 4 INJECTION, SOLUTION INTRAMUSCULAR; INTRAVENOUS at 11:00

## 2024-07-24 RX ADMIN — SENNOSIDES AND DOCUSATE SODIUM 1 TABLET: 50; 8.6 TABLET ORAL at 21:12

## 2024-07-24 RX ADMIN — MIDAZOLAM HYDROCHLORIDE 1 MG: 1 INJECTION, SOLUTION INTRAMUSCULAR; INTRAVENOUS at 10:48

## 2024-07-24 RX ADMIN — HYDROMORPHONE HYDROCHLORIDE 0.4 MG: 2 INJECTION INTRAMUSCULAR; INTRAVENOUS; SUBCUTANEOUS at 11:42

## 2024-07-24 RX ADMIN — HYDROMORPHONE HYDROCHLORIDE 0.4 MG: 2 INJECTION INTRAMUSCULAR; INTRAVENOUS; SUBCUTANEOUS at 15:15

## 2024-07-24 RX ADMIN — HYDROMORPHONE HYDROCHLORIDE 0.4 MG: 2 INJECTION INTRAMUSCULAR; INTRAVENOUS; SUBCUTANEOUS at 11:35

## 2024-07-24 RX ADMIN — HYDROMORPHONE HYDROCHLORIDE 0.2 MG: 2 INJECTION INTRAMUSCULAR; INTRAVENOUS; SUBCUTANEOUS at 14:37

## 2024-07-24 RX ADMIN — ONDANSETRON 4 MG: 2 INJECTION INTRAMUSCULAR; INTRAVENOUS at 14:34

## 2024-07-24 RX ADMIN — PHENYLEPHRINE HYDROCHLORIDE 80 MCG: 10 INJECTION INTRAVENOUS at 12:30

## 2024-07-24 RX ADMIN — ONDANSETRON 4 MG: 2 INJECTION INTRAMUSCULAR; INTRAVENOUS at 09:30

## 2024-07-24 RX ADMIN — ACETAMINOPHEN 1000 MG: 500 TABLET ORAL at 09:21

## 2024-07-24 RX ADMIN — SODIUM CHLORIDE, POTASSIUM CHLORIDE, SODIUM LACTATE AND CALCIUM CHLORIDE: 600; 310; 30; 20 INJECTION, SOLUTION INTRAVENOUS at 12:30

## 2024-07-24 RX ADMIN — LIDOCAINE HYDROCHLORIDE 100 MG: 20 INJECTION, SOLUTION EPIDURAL; INFILTRATION; INTRACAUDAL; PERINEURAL at 10:49

## 2024-07-24 RX ADMIN — PREGABALIN 150 MG: 150 CAPSULE ORAL at 09:22

## 2024-07-24 RX ADMIN — HYDROMORPHONE HYDROCHLORIDE 0.2 MG: 2 INJECTION INTRAMUSCULAR; INTRAVENOUS; SUBCUTANEOUS at 15:02

## 2024-07-24 RX ADMIN — WATER 2000 MG: 1 INJECTION INTRAMUSCULAR; INTRAVENOUS; SUBCUTANEOUS at 23:13

## 2024-07-24 RX ADMIN — PHENYLEPHRINE HYDROCHLORIDE 80 MCG: 10 INJECTION INTRAVENOUS at 12:16

## 2024-07-24 RX ADMIN — ROCURONIUM BROMIDE 10 MG: 10 INJECTION INTRAVENOUS at 13:33

## 2024-07-24 RX ADMIN — SODIUM CHLORIDE, POTASSIUM CHLORIDE, SODIUM LACTATE AND CALCIUM CHLORIDE: 600; 310; 30; 20 INJECTION, SOLUTION INTRAVENOUS at 09:45

## 2024-07-24 RX ADMIN — SODIUM CHLORIDE, PRESERVATIVE FREE 10 ML: 5 INJECTION INTRAVENOUS at 21:12

## 2024-07-24 RX ADMIN — Medication 3 AMPULE: at 09:23

## 2024-07-24 RX ADMIN — HYDROMORPHONE HYDROCHLORIDE 0.4 MG: 2 INJECTION INTRAMUSCULAR; INTRAVENOUS; SUBCUTANEOUS at 13:06

## 2024-07-24 RX ADMIN — PROPOFOL 100 MG: 10 INJECTION, EMULSION INTRAVENOUS at 10:49

## 2024-07-24 RX ADMIN — ROCURONIUM BROMIDE 60 MG: 10 INJECTION INTRAVENOUS at 11:03

## 2024-07-24 RX ADMIN — MIDAZOLAM HYDROCHLORIDE 1 MG: 1 INJECTION, SOLUTION INTRAMUSCULAR; INTRAVENOUS at 10:41

## 2024-07-24 RX ADMIN — WATER 2000 MG: 1 INJECTION INTRAMUSCULAR; INTRAVENOUS; SUBCUTANEOUS at 10:59

## 2024-07-24 RX ADMIN — SUCCINYLCHOLINE CHLORIDE 140 MG: 20 INJECTION, SOLUTION INTRAMUSCULAR; INTRAVENOUS at 10:49

## 2024-07-24 RX ADMIN — ROCURONIUM BROMIDE 10 MG: 10 INJECTION INTRAVENOUS at 10:49

## 2024-07-24 RX ADMIN — FENTANYL CITRATE 50 MCG: 50 INJECTION, SOLUTION INTRAMUSCULAR; INTRAVENOUS at 11:03

## 2024-07-24 RX ADMIN — PHENYLEPHRINE HYDROCHLORIDE 80 MCG: 10 INJECTION INTRAVENOUS at 12:25

## 2024-07-24 RX ADMIN — ACETAMINOPHEN 650 MG: 325 TABLET ORAL at 21:23

## 2024-07-24 RX ADMIN — SUGAMMADEX 200 MG: 100 INJECTION, SOLUTION INTRAVENOUS at 14:49

## 2024-07-24 RX ADMIN — FENTANYL CITRATE 50 MCG: 50 INJECTION, SOLUTION INTRAMUSCULAR; INTRAVENOUS at 10:49

## 2024-07-24 ASSESSMENT — ENCOUNTER SYMPTOMS
SHORTNESS OF BREATH: 1
DYSPNEA ACTIVITY LEVEL: AFTER AMBULATING 1 FLIGHT OF STAIRS

## 2024-07-24 NOTE — ANESTHESIA POSTPROCEDURE EVALUATION
Department of Anesthesiology  Postprocedure Note    Patient: Mehreen Sorenson  MRN: [id number]  YOB: 1950  Date of evaluation: 7/24/2024    Procedure Summary       Date: 07/24/24 Room / Location: [facility] MA[facility] OR M7 / [facility] MA[facility] OR    Anesthesia Start: 1044 Anesthesia Stop: 1515    Procedure: LUMBAR LAMINECTOMY L4, SEGMENTAL FUSION WITH INSTRUMENTATION L4-L5, ARTHRODESIS WITH LOCAL BONE L4-L5 (O-ARM) (Spine Lumbar) Diagnosis:       Spondylolisthesis of lumbar region      Spinal stenosis of lumbar region, unspecified whether neurogenic claudication present      (Spondylolisthesis of lumbar region [M43.16])      (Spinal stenosis of lumbar region, unspecified whether neurogenic claudication present [M48.061])    Providers: Antonio Mobley MD Responsible Provider: Mohinder William MD    Anesthesia Type: general ASA Status: 3            Anesthesia Type: No value filed.    Sheila Phase I: Sheila Score: 8    Sheila Phase II:      Anesthesia Post Evaluation    Patient location during evaluation: PACU  Patient participation: complete - patient participated  Level of consciousness: awake  Pain score: 0  Airway patency: patent  Nausea & Vomiting: no nausea and no vomiting  Cardiovascular status: hemodynamically stable  Respiratory status: acceptable  Hydration status: stable  Multimodal analgesia pain management approach  Pain management: adequate    No notable events documented.

## 2024-07-24 NOTE — ANESTHESIA PRE PROCEDURE
ezetimibe (ZETIA) 10 MG tablet Take 1 tablet by mouth daily     • atorvastatin (LIPITOR) 80 MG tablet Take 1 tablet by mouth daily         Allergies:  No Known Allergies    Problem List:    Patient Active Problem List   Diagnosis Code   • Dysphagia R13.10   • HLD (hyperlipidemia) E78.5   • Abdominal pain, acute, epigastric R10.13   • GERD (gastroesophageal reflux disease) K21.9   • HTN (hypertension) I10   • Joint pain M25.50   • Nausea & vomiting R11.2   • SAS (sleep apnea syndrome) G47.30   • Arthritis M19.90   • Morbid obesity (HCC) E66.01   • Dyspnea on exertion R06.09       Past Medical History:        Diagnosis Date   • Arthritis    • Constipation    • Hyperlipidemia    • ANDREI (obstructive sleep apnea)     diagnosed around , mild but not using CPAP   • PCOS (polycystic ovarian syndrome)    • Spinal stenosis, lumbar    • Spondylolisthesis, grade 1        Past Surgical History:        Procedure Laterality Date   • DILATION AND CURETTAGE OF UTERUS      for bleeding for 3 months   • LAP BAND      placed and removed       Social History:    Social History     Tobacco Use   • Smoking status: Former     Types: Cigarettes     Start date:      Quit date: 1970     Years since quittin.5   • Smokeless tobacco: Never   Substance Use Topics   • Alcohol use: Yes     Comment: occasional                                Counseling given: Not Answered      Vital Signs (Current): There were no vitals filed for this visit.                                           BP Readings from Last 3 Encounters:   24 135/78       NPO Status:                                                                                 BMI:   Wt Readings from Last 3 Encounters:   24 77 kg (169 lb 12.1 oz)     There is no height or weight on file to calculate BMI.    CBC:   Lab Results   Component Value Date/Time    WBC 4.3 2024 09:46 AM    RBC 4.35 2024 09:46 AM    HGB 12.6 2024 09:46 AM    HCT 39.9 2024 09:46 AM

## 2024-07-24 NOTE — PERIOP NOTE
08:45= ambulated independently to Pre Op, A&Ox4, consents verified (3); changed into gown using CHG; voided in BR, no mepilex dsg applied to sacrum d/t surgical procedure; warming blanket applied but not turned on per request.     09:17= Dr. Macedo in to discuss Anesthesia.    09:33= ready for OR; music therapy ([employer]) playing to calm prior to surgery; call bell in reach.    09:57= Dr. Mobley in to discuss surgery.

## 2024-07-24 NOTE — OP NOTE
[facility]              [address] [place]  [address]                            OPERATIVE REPORT      PATIENT NAME: LOPEZ DAY           : 1950  MED REC NO: [id number]                       ROOM: [address] [place] [address] NO: [id number]                       ADMIT DATE: 2024  PROVIDER: Antonio Mobley MD    DATE OF SERVICE:  2024    PREOPERATIVE DIAGNOSES:  Spondylolisthesis with lumbar canal stenosis, L4-5.    POSTOPERATIVE DIAGNOSES:  Spondylolisthesis with lumbar canal stenosis, L4-5.    PROCEDURES PERFORMED:       1. One-level laminectomy and foraminotomies at L4, segmental instrumented fusion L4-L5 with pedicle screws and arthrodesis with local bone, lateral intertransverse from L4-L5.     2. Use of O-arm navigation.    SURGEON:  Antonio Mobley MD    ASSISTANT:  [facility].    ANESTHESIA:  General.    ESTIMATED BLOOD LOSS:  Minimal.    SPECIMENS REMOVED:  None sent.    INTRAOPERATIVE FINDINGS:  See body of report.     COMPLICATIONS:  None.    IMPLANTS:  5.5 x 45 mm long screws x4 in L4 and L5 respectively bilaterally with two 40 mm long rods, local bone and [place] putty.    INDICATIONS:  Spondylolisthesis.    DESCRIPTION OF PROCEDURE:  Review of imaging studies revealed a grade 1 spondylolisthesis with significant stenosis at the L4-5 interspace level on this patient that did not note improvement with conservative measures of her leg and back pain.  After discussing the risks, benefits, alternatives of surgery with her, she wished to proceed.    She was taken to the operating room where she was induced under general endotracheal anesthesia, placed on the operating table in the prone position on the Parth table.  The lumbar region scrubbed, prepped, and draped in the usual sterile fashion.  A time-out was performed to identify the correct patient and procedure.  Appropriate antibiotics administered and sequential stockings applied for DVT

## 2024-07-25 PROCEDURE — 2700000000 HC OXYGEN THERAPY PER DAY

## 2024-07-25 PROCEDURE — 6370000000 HC RX 637 (ALT 250 FOR IP): Performed by: NEUROLOGICAL SURGERY

## 2024-07-25 PROCEDURE — G0378 HOSPITAL OBSERVATION PER HR: HCPCS

## 2024-07-25 PROCEDURE — 2580000003 HC RX 258: Performed by: NEUROLOGICAL SURGERY

## 2024-07-25 PROCEDURE — 97165 OT EVAL LOW COMPLEX 30 MIN: CPT | Performed by: OCCUPATIONAL THERAPIST

## 2024-07-25 PROCEDURE — 51798 US URINE CAPACITY MEASURE: CPT

## 2024-07-25 PROCEDURE — 97535 SELF CARE MNGMENT TRAINING: CPT | Performed by: OCCUPATIONAL THERAPIST

## 2024-07-25 PROCEDURE — 97162 PT EVAL MOD COMPLEX 30 MIN: CPT

## 2024-07-25 PROCEDURE — 97530 THERAPEUTIC ACTIVITIES: CPT

## 2024-07-25 PROCEDURE — 97116 GAIT TRAINING THERAPY: CPT

## 2024-07-25 PROCEDURE — 94760 N-INVAS EAR/PLS OXIMETRY 1: CPT

## 2024-07-25 PROCEDURE — 97530 THERAPEUTIC ACTIVITIES: CPT | Performed by: OCCUPATIONAL THERAPIST

## 2024-07-25 PROCEDURE — 6360000002 HC RX W HCPCS: Performed by: NEUROLOGICAL SURGERY

## 2024-07-25 RX ADMIN — OXYCODONE 10 MG: 5 TABLET ORAL at 20:16

## 2024-07-25 RX ADMIN — ACETAMINOPHEN 650 MG: 325 TABLET ORAL at 09:11

## 2024-07-25 RX ADMIN — ACETAMINOPHEN 650 MG: 325 TABLET ORAL at 03:53

## 2024-07-25 RX ADMIN — POLYETHYLENE GLYCOL 3350 17 G: 17 POWDER, FOR SOLUTION ORAL at 09:10

## 2024-07-25 RX ADMIN — OXYCODONE 5 MG: 5 TABLET ORAL at 03:53

## 2024-07-25 RX ADMIN — ACETAMINOPHEN 650 MG: 325 TABLET ORAL at 20:14

## 2024-07-25 RX ADMIN — ATORVASTATIN CALCIUM 80 MG: 40 TABLET, FILM COATED ORAL at 09:10

## 2024-07-25 RX ADMIN — SODIUM CHLORIDE, PRESERVATIVE FREE 10 ML: 5 INJECTION INTRAVENOUS at 09:10

## 2024-07-25 RX ADMIN — EZETIMIBE 10 MG: 10 TABLET ORAL at 09:10

## 2024-07-25 RX ADMIN — SENNOSIDES AND DOCUSATE SODIUM 1 TABLET: 50; 8.6 TABLET ORAL at 09:10

## 2024-07-25 RX ADMIN — WATER 2000 MG: 1 INJECTION INTRAMUSCULAR; INTRAVENOUS; SUBCUTANEOUS at 06:30

## 2024-07-25 RX ADMIN — OXYCODONE 10 MG: 5 TABLET ORAL at 12:59

## 2024-07-25 RX ADMIN — OXYCODONE 5 MG: 5 TABLET ORAL at 07:10

## 2024-07-25 RX ADMIN — SENNOSIDES AND DOCUSATE SODIUM 1 TABLET: 50; 8.6 TABLET ORAL at 20:15

## 2024-07-25 RX ADMIN — ACETAMINOPHEN 650 MG: 325 TABLET ORAL at 15:30

## 2024-07-25 RX ADMIN — SODIUM CHLORIDE, PRESERVATIVE FREE 10 ML: 5 INJECTION INTRAVENOUS at 20:18

## 2024-07-25 NOTE — PROGRESS NOTES
End of Shift Note    Bedside shift change report given to Alesia WERNER RN (oncoming nurse) by Amada Gomez RN (offgoing nurse).  Report included the following information SBAR, Kardex, Procedure Summary, and MAR    Shift worked:  days     Shift summary and any significant changes:     Voiding after clayton removal, VSS, pain control c oral tyl/oxy prn. Drain output 130     Concerns for physician to address:       Zone phone for oncoming shift:   [phone]       Activity:     Number times ambulated in hallways past shift: 0  Number of times OOB to chair past shift: 0    Cardiac:   Cardiac Monitoring: No           Access:  Current line(s): PIV     Genitourinary:   Urinary status: Voiding    Respiratory:      Chronic home O2 use?: NO  Incentive spirometer at bedside: YES       GI:     Current diet:  ADULT DIET; Regular  Passing flatus: NO  Tolerating current diet: YES       Pain Management:   Patient states pain is manageable on current regimen: YES    Skin:     Interventions: increase time out of bed, PT/OT consult, and limit briefs    Patient Safety:  Fall Score:    Interventions: bed/chair alarm, assistive device (walker, cane. etc), gripper socks, pt to call before getting OOB, stay with me (per policy), and gait belt       Length of Stay:  Expected LOS: 2  Actual LOS: 0      Amada Gomez RN

## 2024-07-25 NOTE — PROGRESS NOTES
Neurosurgery spine progress Note    POD# 1  s/p LUMBAR LAMINECTOMY L4, SEGMENTAL FUSION WITH INSTRUMENTATION L4-L5, ARTHRODESIS WITH LOCAL BONE L4-L5 (O-ARM)   Pt seen with no visitors at bedside, she is awake alert and oriented. Reports no pain currently, occasional mild pain with trying to turn. Tolerating diet, clayton out this am.    Patient in bed    VSS Afebrile.    Visit Vitals  /69   Pulse 66   Temp 97.8 °F (36.6 °C) (Oral)   Resp 18   Ht 1.549 m (5' 1\")   Wt 76 kg (167 lb 8.8 oz)   SpO2 99%   BMI 31.66 kg/m²       Voiding status: clayton out this am, pending void.           Labs    Lab Results   Component Value Date/Time    HGB 12.6 07/18/2024 09:46 AM      Lab Results   Component Value Date/Time    INR 1.0 07/18/2024 09:46 AM      Lab Results   Component Value Date/Time     07/18/2024 09:46 AM    K 3.9 07/18/2024 09:46 AM     07/18/2024 09:46 AM    CO2 25 07/18/2024 09:46 AM    BUN 16 07/18/2024 09:46 AM     Recent Glucose Results:   Glucose   Date Value Ref Range Status   07/18/2024 88 65 - 100 mg/dL Final           Body mass index is 31.66 kg/m². : A BMI > 30 is classified as obesity and > 40 is classified as morbid obesity.     Awake and alert. No acute distress.    Dressing: Optifoam C.D.I. hvac drain, cannister empty.  No significant erythema or swelling  Cryotherapy in place over incision.   BLE sensation to light touch intact  BLE motor intact. Strength 5/5    SCD for mechanical DVT proph while in bed        PLAN:  1) PT BID - WBAT with LSO.   2) Neurovascular Checks Every 4 hours   3) Pain control - scheduled tylenol  and prn  oxycodone    4) Readiness for discharge:     [x] Vital Signs stable    [x] Labs stable    [] + Voiding    [x] Wound intact, drainage minimal    [x] Tolerating PO intake     [] Cleared by PT (OT if applicable) for discharge   [x] Adequate pain control on oral medication alone       Discharge Plan: Plan to DC home tomorrow.          Fauzia Longo PA-C,

## 2024-07-25 NOTE — CARE COORDINATION
2 p.m. [facility] accepted. CM placed on AVS.     1:40 p.m. CM reviewed chart and noted therapy is recommending HH at d/c.  CM met with pt at bedside. Pt in agreement.  FOC offered; no preference.      CM following pt on ortho unit after a planned procedure.     Lindy Smyth LMSW  Supervisee in Social Work  Care Management, [facility]  [phone]

## 2024-07-25 NOTE — PROGRESS NOTES
Awake alert comfortable  DIXON well  no leg pain  numbness in legs better  needs to ambulate  expect she'll be ready to go home tomorrow   good progress  follow up in office after discharge

## 2024-07-26 VITALS
OXYGEN SATURATION: 98 % | HEART RATE: 86 BPM | HEIGHT: 61 IN | BODY MASS INDEX: 31.63 KG/M2 | TEMPERATURE: 98.1 F | RESPIRATION RATE: 16 BRPM | SYSTOLIC BLOOD PRESSURE: 110 MMHG | DIASTOLIC BLOOD PRESSURE: 75 MMHG | WEIGHT: 167.55 LBS

## 2024-07-26 PROCEDURE — G0378 HOSPITAL OBSERVATION PER HR: HCPCS

## 2024-07-26 PROCEDURE — 97535 SELF CARE MNGMENT TRAINING: CPT | Performed by: OCCUPATIONAL THERAPIST

## 2024-07-26 PROCEDURE — 97530 THERAPEUTIC ACTIVITIES: CPT

## 2024-07-26 PROCEDURE — 97530 THERAPEUTIC ACTIVITIES: CPT | Performed by: OCCUPATIONAL THERAPIST

## 2024-07-26 PROCEDURE — 6370000000 HC RX 637 (ALT 250 FOR IP): Performed by: NEUROLOGICAL SURGERY

## 2024-07-26 PROCEDURE — 2580000003 HC RX 258: Performed by: NEUROLOGICAL SURGERY

## 2024-07-26 PROCEDURE — 97116 GAIT TRAINING THERAPY: CPT

## 2024-07-26 PROCEDURE — 6370000000 HC RX 637 (ALT 250 FOR IP): Performed by: PHYSICIAN ASSISTANT

## 2024-07-26 RX ORDER — OXYCODONE HYDROCHLORIDE 5 MG/1
5 TABLET ORAL EVERY 6 HOURS PRN
Qty: 28 TABLET | Refills: 0 | Status: SHIPPED | OUTPATIENT
Start: 2024-07-26 | End: 2024-08-02

## 2024-07-26 RX ORDER — SENNA AND DOCUSATE SODIUM 50; 8.6 MG/1; MG/1
1 TABLET, FILM COATED ORAL 2 TIMES DAILY
Qty: 20 TABLET | Refills: 0 | Status: SHIPPED | OUTPATIENT
Start: 2024-07-26

## 2024-07-26 RX ORDER — CYCLOBENZAPRINE HCL 10 MG
10 TABLET ORAL 2 TIMES DAILY
Status: DISCONTINUED | OUTPATIENT
Start: 2024-07-26 | End: 2024-07-26 | Stop reason: HOSPADM

## 2024-07-26 RX ORDER — CYCLOBENZAPRINE HCL 10 MG
10 TABLET ORAL 2 TIMES DAILY
Qty: 20 TABLET | Refills: 0 | Status: SHIPPED | OUTPATIENT
Start: 2024-07-26 | End: 2024-08-05

## 2024-07-26 RX ORDER — LOSARTAN POTASSIUM 25 MG/1
25 TABLET ORAL ONCE
Status: DISCONTINUED | OUTPATIENT
Start: 2024-07-26 | End: 2024-07-26

## 2024-07-26 RX ADMIN — EZETIMIBE 10 MG: 10 TABLET ORAL at 07:59

## 2024-07-26 RX ADMIN — OXYCODONE 10 MG: 5 TABLET ORAL at 04:43

## 2024-07-26 RX ADMIN — OXYCODONE 10 MG: 5 TABLET ORAL at 11:03

## 2024-07-26 RX ADMIN — SENNOSIDES AND DOCUSATE SODIUM 1 TABLET: 50; 8.6 TABLET ORAL at 07:59

## 2024-07-26 RX ADMIN — SODIUM CHLORIDE, PRESERVATIVE FREE 10 ML: 5 INJECTION INTRAVENOUS at 07:59

## 2024-07-26 RX ADMIN — ATORVASTATIN CALCIUM 80 MG: 40 TABLET, FILM COATED ORAL at 07:59

## 2024-07-26 RX ADMIN — OXYCODONE 10 MG: 5 TABLET ORAL at 01:30

## 2024-07-26 RX ADMIN — OXYCODONE 10 MG: 5 TABLET ORAL at 07:58

## 2024-07-26 RX ADMIN — ACETAMINOPHEN 650 MG: 325 TABLET ORAL at 07:58

## 2024-07-26 RX ADMIN — POLYETHYLENE GLYCOL 3350 17 G: 17 POWDER, FOR SOLUTION ORAL at 07:59

## 2024-07-26 RX ADMIN — CYCLOBENZAPRINE 10 MG: 10 TABLET, FILM COATED ORAL at 11:03

## 2024-07-26 RX ADMIN — ONDANSETRON 4 MG: 4 TABLET, ORALLY DISINTEGRATING ORAL at 10:04

## 2024-07-26 RX ADMIN — ACETAMINOPHEN 650 MG: 325 TABLET ORAL at 03:06

## 2024-07-26 RX ADMIN — PANTOPRAZOLE SODIUM 40 MG: 40 TABLET, DELAYED RELEASE ORAL at 07:03

## 2024-07-26 NOTE — PROGRESS NOTES
Ortho / Neurosurgery spine progress Note    POD# 2  s/p LUMBAR LAMINECTOMY L4, SEGMENTAL FUSION WITH INSTRUMENTATION L4-L5, ARTHRODESIS WITH LOCAL BONE L4-L5 (O-ARM)   Pt seen with no visitors. Reports 10/10 back pain overnight, because she got her oxycodone late. Reports pain is better this morning. Denies numbness and tingling. Has been getting up to go to the bathroom a few times. Tolerating diet.     Patient in bed    VSS Afebrile.    Visit Vitals  /62   Pulse 55   Temp 98.1 °F (36.7 °C)   Resp 16   Ht 1.549 m (5' 1\")   Wt 76 kg (167 lb 8.8 oz)   SpO2 97%   BMI 31.66 kg/m²       Voiding status: Voiding independently.             Labs    Lab Results   Component Value Date/Time    HGB 12.6 07/18/2024 09:46 AM      Lab Results   Component Value Date/Time    INR 1.0 07/18/2024 09:46 AM      Lab Results   Component Value Date/Time     07/18/2024 09:46 AM    K 3.9 07/18/2024 09:46 AM     07/18/2024 09:46 AM    CO2 25 07/18/2024 09:46 AM    BUN 16 07/18/2024 09:46 AM     Recent Glucose Results:   Glucose   Date Value Ref Range Status   07/18/2024 88 65 - 100 mg/dL Final           Body mass index is 31.66 kg/m². : A BMI > 30 is classified as obesity and > 40 is classified as morbid obesity.     Awake and alert. No acute distress.    Dressing:  C.D.I HVAC with cannister less than 10cc, emptied 40ccs this am   No significant erythema or swelling  Cryotherapy in place over incision.   BLE sensation to light touch intact  BLE motor intact. Strength 5/5. Good ankle dorsiflexion/planter flexion.    SCD for mechanical DVT proph while in bed        PLAN:  1) PT BID - WBAT.   2) Neurovascular Checks Every 4 hours   3) Pain control - scheduled tylenol  and prn  oxycodone    4) Readiness for discharge:     [x] Vital Signs stable    [x] Labs stable    [x] + Voiding    [x] Wound intact, drainage minimal    [x] Tolerating PO intake     [] Cleared by PT (OT if applicable) for discharge   [x] Adequate pain control on

## 2024-07-26 NOTE — DISCHARGE SUMMARY
Reported? Taking?   Multiple Vitamins-Minerals (THERAPEUTIC MULTIVITAMIN-MINERALS) tablet 7/17/2024  Yes No   Sig: Take 1 tablet by mouth daily   aspirin 81 MG EC tablet 7/17/2024  Yes No   Sig: Take 1 tablet by mouth daily   atorvastatin (LIPITOR) 80 MG tablet 7/23/2024  Yes No   Sig: Take 1 tablet by mouth daily   calcium carbonate (OSCAL) 500 MG TABS tablet 7/17/2024  Yes No   Sig: Take 1 tablet by mouth daily   ezetimibe (ZETIA) 10 MG tablet 7/23/2024  Yes No   Sig: Take 1 tablet by mouth daily   linaclotide (LINZESS) 145 MCG capsule 7/23/2024  Yes No   Sig: Take 1 capsule by mouth as needed   omeprazole (PRILOSEC) 20 MG delayed release capsule 7/17/2024  Yes No   Sig: Take 1 capsule by mouth daily   vitamin D 25 MCG (1000 UT) CAPS 7/17/2024  Yes No   Sig: Take by mouth daily      Facility-Administered Medications: None        Allergies:  No Known Allergies     Hospital Course:  The patient underwent surgery.  Complications:  None; patient tolerated the procedure well. Was taken to the PACU in stable condition and then transferred to the ortho floor.      Perioperative Antibiotics:  Ancef     Postoperative Pain Management:  Oxycodone    Postoperative transfusions:    Number of units banked PRBCs =   none     Post Op complications: none    Hemoglobin at discharge:    Lab Results   Component Value Date/Time    HGB 12.6 07/18/2024 09:46 AM    INR 1.0 07/18/2024 09:46 AM       Dressing was changed on POD # 1.  Incision - clean, dry and intact. No significant erythema or swelling. Wound appears to be healing without any evidence of infection. Pt had a HVAC drain that was removed on POD# 2. Neurovascular exam found to be within normal limits.    Physical Therapy started following surgery and participated in bed mobility, transfers and ambulation.              Discharged to: Home.    Condition on Discharge:   Good with     Discharge instructions:  - Take pain medications as prescribed  - Resume pre hospital diet

## 2024-07-26 NOTE — PROGRESS NOTES
End of Shift Note    Bedside shift change report given to Kaleigh OROPEZA (oncoming nurse) by MARINO BRUNSON RN (offgoing nurse).  Report included the following information SBAR, Kardex, OR Summary, Procedure Summary, Intake/Output, and MAR    Shift worked:  7p-7a     Shift summary and any significant changes:     Ambulating to bathroom with walker and standby assist.  Voiding  Pain was difficult to control  better when oxycodone 10 mg given q 3hr  Drain output 115ml for shift     Concerns for physician to address:  Dc  discuss pain control for home     Zone phone for oncoming shift:          Activity:     Number times ambulated in hallways past shift: 0  Number of times OOB to chair past shift: 0    Cardiac:   Cardiac Monitoring: No           Access:  Current line(s): PIV     Genitourinary:   Urinary status: voiding    Respiratory:      Chronic home O2 use?: NO  Incentive spirometer at bedside: YES       GI:     Current diet:  ADULT DIET; Regular  Passing flatus: YES  Tolerating current diet: YES       Pain Management:   Patient states pain is manageable on current regimen: YES    Skin:     Interventions: float heels, increase time out of bed, PT/OT consult, limit briefs, and nutritional support    Patient Safety:  Fall Score:    Interventions: bed/chair alarm, assistive device (walker, cane. etc), gripper socks, pt to call before getting OOB, and stay with me (per policy)       Length of Stay:  Expected LOS: 2  Actual LOS: 0      MARINO BRUNSON RN

## 2024-07-26 NOTE — PLAN OF CARE
Problem: Occupational Therapy - Adult  Goal: By Discharge: Performs self-care activities at highest level of function for planned discharge setting.  See evaluation for individualized goals.  Description: FUNCTIONAL STATUS PRIOR TO ADMISSION:  Patient is fully independent with ADLs, IADLs and ambulation. She also was attending the gym 3 times per week.   HOME SUPPORT: Patient was living with her .     Occupational Therapy Goals:  Initiated 7/25/2024  1.  Patient will perform grooming standing at sink with Temperance within 7 day(s).  2.  Patient will perform upper body dressing with Set-up within 7 day(s).  3.  Patient will perform lower body dressing with Set-up and use of LB AE PRN within 7 day(s).  4.  Patient will perform toilet transfers with Modified Temperance  within 7 day(s).  5.  Patient will perform all aspects of toileting with Modified Temperance within 7 day(s).  6.  Patient will sponge bathing with Supervision, using LH sponge for LB and sitting PRN, within 7 day(s).     Outcome: Progressing     OCCUPATIONAL THERAPY TREATMENT  Patient: Mehreen Sorenson (73 y.o. female)  Date: 7/26/2024  Primary Diagnosis: Spondylolisthesis of lumbar region [M43.16]  Spinal stenosis of lumbar region, unspecified whether neurogenic claudication present [M48.061]  Lumbar stenosis with neurogenic claudication [M48.062]  Procedure(s) (LRB):  LUMBAR LAMINECTOMY L4, SEGMENTAL FUSION WITH INSTRUMENTATION L4-L5, ARTHRODESIS WITH LOCAL BONE L4-L5 (O-ARM) (N/A) 2 Days Post-Op   Precautions:  (Spine/no BLT, LSO)         Spinal Precautions: No Bending, No Lifting, No Twisting      Chart, occupational therapy assessment, plan of care, and goals were reviewed.    ASSESSMENT  Patient presented up in chair, agreeable to work with OT. Overall she was able to perform her transfers and ambulation with a RW at a mod I level, she was supervision for standing grooming, and she was setup to min A for dressing ADLs, using AE 
  Problem: Physical Therapy - Adult  Goal: By Discharge: Performs mobility at highest level of function for planned discharge setting.  See evaluation for individualized goals.  Description: FUNCTIONAL STATUS PRIOR TO ADMISSION: Patient was independent and active without use of DME. Attends the gym 3 days/wk for a mix of cardio and weight training.     HOME SUPPORT PRIOR TO ADMISSION: The patient lived with  who is able to assist.    Physical Therapy Goals  Initiated 7/25/2024  1.  Patient will move from supine to sit and sit to supine, scoot up and down, and roll side to side in bed with modified independence within 7 day(s).    2.  Patient will perform sit to stand with modified independence within 7 day(s).  3.  Patient will transfer from bed to chair and chair to bed with modified independence using the least restrictive device within 7 day(s).  4.  Patient will ambulate with modified independence for 300 feet with the least restrictive device within 7 day(s).   5.  Patient will ascend/descend 3 stairs with 1 handrail(s) with modified independence within 7 day(s).   7/25/2024 1026 by Ninfa Tapia, PT  Outcome: Progressing   PHYSICAL THERAPY TREATMENT    Patient: Mehreen Sorenson (73 y.o. female)  Date: 7/25/2024  Diagnosis: Spondylolisthesis of lumbar region [M43.16]  Spinal stenosis of lumbar region, unspecified whether neurogenic claudication present [M48.061]  Lumbar stenosis with neurogenic claudication [M48.062] Lumbar stenosis with neurogenic claudication  Procedure(s) (LRB):  LUMBAR LAMINECTOMY L4, SEGMENTAL FUSION WITH INSTRUMENTATION L4-L5, ARTHRODESIS WITH LOCAL BONE L4-L5 (O-ARM) (N/A) 1 Day Post-Op  Precautions:  (Spine/no BLT, LSO)         Spinal Precautions: No Bending, No Lifting, No Twisting     Required Braces or Orthoses  Spinal: Lumbar Corset      ASSESSMENT:  Patient continues to benefit from skilled PT services and is slowly progressing towards goals, remaining limited by 
  Problem: Physical Therapy - Adult  Goal: By Discharge: Performs mobility at highest level of function for planned discharge setting.  See evaluation for individualized goals.  Description: FUNCTIONAL STATUS PRIOR TO ADMISSION: Patient was independent and active without use of DME. Attends the gym 3 days/wk for a mix of cardio and weight training.     HOME SUPPORT PRIOR TO ADMISSION: The patient lived with  who is able to assist.    Physical Therapy Goals  Initiated 7/25/2024  1.  Patient will move from supine to sit and sit to supine, scoot up and down, and roll side to side in bed with modified independence within 7 day(s).    2.  Patient will perform sit to stand with modified independence within 7 day(s).  3.  Patient will transfer from bed to chair and chair to bed with modified independence using the least restrictive device within 7 day(s).  4.  Patient will ambulate with modified independence for 300 feet with the least restrictive device within 7 day(s).   5.  Patient will ascend/descend 3 stairs with 1 handrail(s) with modified independence within 7 day(s).   Outcome: Progressing   PHYSICAL THERAPY EVALUATION    Patient: Mehreen Sorenson (73 y.o. female)  Date: 7/25/2024  Primary Diagnosis: Spondylolisthesis of lumbar region [M43.16]  Spinal stenosis of lumbar region, unspecified whether neurogenic claudication present [M48.061]  Lumbar stenosis with neurogenic claudication [M48.062]  Procedure(s) (LRB):  LUMBAR LAMINECTOMY L4, SEGMENTAL FUSION WITH INSTRUMENTATION L4-L5, ARTHRODESIS WITH LOCAL BONE L4-L5 (O-ARM) (N/A) 1 Day Post-Op   Precautions: Required Braces or Orthoses?: Yes Required Braces or Orthoses?: Yes       Spinal Precautions: No Bending, No Lifting, No Twisting     Required Braces or Orthoses  Spinal: Lumbar Corset      ASSESSMENT :   DEFICITS/IMPAIRMENTS:   The patient is limited by increased pain levels, impaired activity tolerance, generalized weakness, and overall impaired 
  Problem: Safety - Adult  Goal: Free from fall injury  7/25/2024 0941 by Amada Gomez RN  Outcome: Progressing  Flowsheets  Taken 7/25/2024 0941  Free From Fall Injury: Instruct family/caregiver on patient safety  Taken 7/25/2024 0823  Free From Fall Injury: Instruct family/caregiver on patient safety  7/24/2024 2146 by Carrie Hill RN  Outcome: Progressing  Flowsheets (Taken 7/24/2024 1716 by Neena Del Toro, RN)  Free From Fall Injury: Instruct family/caregiver on patient safety     Problem: ABCDS Injury Assessment  Goal: Absence of physical injury  7/25/2024 0941 by Amada Gomez RN  Outcome: Progressing  Flowsheets (Taken 7/24/2024 1716 by Neena Del Toro, RN)  Absence of Physical Injury: Implement safety measures based on patient assessment  7/24/2024 2146 by Carrie Hill RN  Outcome: Progressing  Flowsheets (Taken 7/24/2024 1716 by Neena Del Toro, RN)  Absence of Physical Injury: Implement safety measures based on patient assessment     Problem: Pain  Goal: Verbalizes/displays adequate comfort level or baseline comfort level  7/25/2024 0941 by Amada Gomez RN  Outcome: Progressing  Flowsheets (Taken 7/25/2024 0941)  Verbalizes/displays adequate comfort level or baseline comfort level:   Encourage patient to monitor pain and request assistance   Assess pain using appropriate pain scale   Administer analgesics based on type and severity of pain and evaluate response   Implement non-pharmacological measures as appropriate and evaluate response  7/24/2024 2146 by Carrie Hill RN  Outcome: Progressing     Problem: Skin/Tissue Integrity - Adult  Goal: Incisions, wounds, or drain sites healing without S/S of infection  Outcome: Progressing  Flowsheets (Taken 7/25/2024 0941)  Incisions, Wounds, or Drain Sites Healing Without Sign and Symptoms of Infection:   TWICE DAILY: Assess and document skin integrity   TWICE DAILY: Assess and document dressing/incision, wound bed, drain sites and surrounding 
  Problem: Safety - Adult  Goal: Free from fall injury  7/26/2024 1003 by Kaleigh Oconnell RN  Outcome: Progressing  7/26/2024 0138 by Cathy Ruiz RN  Outcome: Progressing     Problem: ABCDS Injury Assessment  Goal: Absence of physical injury  7/26/2024 1003 by Kaleigh Oconnell RN  Outcome: Progressing  7/26/2024 0138 by Cathy Ruiz RN  Outcome: Progressing     Problem: Pain  Goal: Verbalizes/displays adequate comfort level or baseline comfort level  7/26/2024 1003 by Kaleigh Oconnell RN  Outcome: Progressing  Flowsheets (Taken 7/26/2024 0443 by Cathy Ruiz RN)  Verbalizes/displays adequate comfort level or baseline comfort level:   Encourage patient to monitor pain and request assistance   Assess pain using appropriate pain scale   Administer analgesics based on type and severity of pain and evaluate response   Implement non-pharmacological measures as appropriate and evaluate response  7/26/2024 0138 by Cathy Ruiz RN  Outcome: Progressing  Flowsheets (Taken 7/25/2024 2014)  Verbalizes/displays adequate comfort level or baseline comfort level:   Encourage patient to monitor pain and request assistance   Administer analgesics based on type and severity of pain and evaluate response   Assess pain using appropriate pain scale   Implement non-pharmacological measures as appropriate and evaluate response     Problem: Skin/Tissue Integrity - Adult  Goal: Incisions, wounds, or drain sites healing without S/S of infection  7/26/2024 1003 by Kaleigh Oconnell RN  Outcome: Progressing  7/26/2024 0138 by Cathy Ruiz RN  Outcome: Progressing  Flowsheets (Taken 7/25/2024 2216)  Incisions, Wounds, or Drain Sites Healing Without Sign and Symptoms of Infection: TWICE DAILY: Assess and document skin integrity     Problem: Genitourinary - Adult  Goal: Absence of urinary retention  7/26/2024 1003 by Kaleigh Oconnell RN  Outcome: Progressing  7/26/2024 0138 by Cathy Ruiz RN  Outcome: 
  Problem: Safety - Adult  Goal: Free from fall injury  7/26/2024 1135 by Kaleigh Oconnell RN  Outcome: Adequate for Discharge  7/26/2024 1003 by Kaleigh Oconnell RN  Outcome: Progressing  7/26/2024 0138 by Cathy Ruiz RN  Outcome: Progressing     Problem: ABCDS Injury Assessment  Goal: Absence of physical injury  7/26/2024 1135 by Kaleigh Oconnell RN  Outcome: Adequate for Discharge  7/26/2024 1003 by Kaleigh Oconnell RN  Outcome: Progressing  7/26/2024 0138 by Cathy Ruiz RN  Outcome: Progressing     Problem: Pain  Goal: Verbalizes/displays adequate comfort level or baseline comfort level  7/26/2024 1135 by Kaleigh Oconnell RN  Outcome: Adequate for Discharge  7/26/2024 1003 by Kaleigh Oconnell RN  Outcome: Progressing  Flowsheets (Taken 7/26/2024 0443 by Cathy Ruiz RN)  Verbalizes/displays adequate comfort level or baseline comfort level:   Encourage patient to monitor pain and request assistance   Assess pain using appropriate pain scale   Administer analgesics based on type and severity of pain and evaluate response   Implement non-pharmacological measures as appropriate and evaluate response  7/26/2024 0138 by Cathy Ruiz RN  Outcome: Progressing  Flowsheets (Taken 7/25/2024 2014)  Verbalizes/displays adequate comfort level or baseline comfort level:   Encourage patient to monitor pain and request assistance   Administer analgesics based on type and severity of pain and evaluate response   Assess pain using appropriate pain scale   Implement non-pharmacological measures as appropriate and evaluate response     Problem: Skin/Tissue Integrity - Adult  Goal: Incisions, wounds, or drain sites healing without S/S of infection  7/26/2024 1135 by Kaleigh Oconnell RN  Outcome: Adequate for Discharge  7/26/2024 1003 by Kaleigh Oconnell RN  Outcome: Progressing  7/26/2024 0138 by Cathy Ruiz RN  Outcome: Progressing  Flowsheets (Taken 7/25/2024 2216)  Incisions, Wounds, or Drain 
  Problem: Safety - Adult  Goal: Free from fall injury  Outcome: Progressing  Flowsheets (Taken 7/24/2024 1716 by Neena Del Toro, RN)  Free From Fall Injury: Instruct family/caregiver on patient safety     Problem: ABCDS Injury Assessment  Goal: Absence of physical injury  Outcome: Progressing  Flowsheets (Taken 7/24/2024 1716 by Neena Del Toro, RN)  Absence of Physical Injury: Implement safety measures based on patient assessment     Problem: Pain  Goal: Verbalizes/displays adequate comfort level or baseline comfort level  Outcome: Progressing     
Problem: Physical Therapy - Adult  Goal: By Discharge: Performs mobility at highest level of function for planned discharge setting.  See evaluation for individualized goals.  Description: FUNCTIONAL STATUS PRIOR TO ADMISSION: Patient was independent and active without use of DME. Attends the gym 3 days/wk for a mix of cardio and weight training.     HOME SUPPORT PRIOR TO ADMISSION: The patient lived with  who is able to assist.    Physical Therapy Goals  Initiated 7/25/2024  1.  Patient will move from supine to sit and sit to supine, scoot up and down, and roll side to side in bed with modified independence within 7 day(s).    2.  Patient will perform sit to stand with modified independence within 7 day(s).  3.  Patient will transfer from bed to chair and chair to bed with modified independence using the least restrictive device within 7 day(s).  4.  Patient will ambulate with modified independence for 300 feet with the least restrictive device within 7 day(s).   5.  Patient will ascend/descend 3 stairs with 1 handrail(s) with modified independence within 7 day(s).   Outcome: Adequate for Discharge   PHYSICAL THERAPY TREATMENT    Patient: Mehreen Sorenson (73 y.o. female)  Date: 7/26/2024  Diagnosis: Spondylolisthesis of lumbar region [M43.16]  Spinal stenosis of lumbar region, unspecified whether neurogenic claudication present [M48.061]  Lumbar stenosis with neurogenic claudication [M48.062] Lumbar stenosis with neurogenic claudication  Procedure(s) (LRB):  LUMBAR LAMINECTOMY L4, SEGMENTAL FUSION WITH INSTRUMENTATION L4-L5, ARTHRODESIS WITH LOCAL BONE L4-L5 (O-ARM) (N/A) 2 Days Post-Op  Precautions:  (Spine/no BLT, LSO)         Spinal Precautions: No Bending, No Lifting, No Twisting     Required Braces or Orthoses  Spinal: Lumbar Corset      ASSESSMENT:  Patient continues to benefit from skilled PT services and is progressing towards goals. Patient initially c/o slight nausea and lightheadedness but 
Stand by assistance for ambulation with walker.  Continue to medicate for pain as needed. Discuss alternate methods of  supportive pain control  
and safety training.      Reviewed post op spine precautions as they relate to bed mobility and ADL performance. Instructed patient to avoid reaching across the midline of her body to prevent trunk rotation during bathing, dressing and the performance of item retrieval during ADLs and meal prep. Patient instructed in log rolling technique for bed mobility and use of leg crossed technique for LB dressing to prevent trunk rotation and flexion, as well as issued and provided AE training. Instructed patient and family to have needed items placed at a level between shoulder and knee height , so they can be easily accessed to promote proper body mechanics and to minimize risk for LOB. Patient able to verbalize and/or demonstrate full understanding upon completion of this education/training.         Pain Ratin/10 lumbar surgical site and R>L hip.  Pain Intervention(s):   patient medicated for pain prior to session and rest    Activity Tolerance:   Fair     After treatment:   Patient left in no apparent distress sitting up in chair, Call bell within reach, Bed/ chair alarm activated, and Caregiver / family present    COMMUNICATION/EDUCATION:   The patient's plan of care was discussed with: physical therapist and registered nurse    Patient Education  Education Given To: Patient  Education Provided: Role of Therapy;ADL Adaptive Strategies;Transfer Training;Precautions;Equipment;Family Education;Plan of Care  Education Method: Verbal;Demonstration  Barriers to Learning: None  Education Outcome: Verbalized understanding;Continued education needed    Thank you for this referral.  Deejay Jerez, OTR/L  Minutes: 46

## 2024-07-26 NOTE — PROGRESS NOTES
DISCHARGE NOTE FROM ORTHO NURSE    Patient determined to be stable for discharge by attending provider. I have reviewed the discharge instructions with the patient and spouse. They verbalized understanding and all questions were answered to their satisfaction. No complaints or further questions were expressed.      Medications sent to pharmacy. Appropriate educational materials and medication side effect teaching were provided.      PIV and MUSA drain were removed prior to discharge.     Patient did not discharge with any line, clayton, or drain.    Personal items and valuables accounted for at discharge by patient and/or family: Yes    Post-op patient: Yes-Patient given post-op discharge kit and instructed on use.    Kaleigh Oconnell, RN

## 2024-07-26 NOTE — CARE COORDINATION
Pt is cleared for d/c from a CM standpoint.        07/26/24 1113   Services At/After Discharge   Transition of Care Consult (CM Consult) Discharge Planning   Services At/After Discharge Home Health  ([facility])   Mode of Transport at Discharge Other (see comment)  (family at bedside)   Condition of Participation: Discharge Planning   The Patient and/or Patient Representative was provided with a Choice of Provider? Patient   The Patient and/Or Patient Representative agree with the Discharge Plan? Yes   Freedom of Choice list was provided with basic dialogue that supports the patient's individualized plan of care/goals, treatment preferences, and shares the quality data associated with the providers?  Yes       CM acknowledged d/c order.  Family at bedside to transport pt home.  CM will inform [facility] pt is discharging.    Lindy Smyth LMSW  Supervisee in Social Work  Care Management, [facility]  [phone]

## 2024-11-05 ENCOUNTER — CARE COORDINATION (OUTPATIENT)
Dept: OTHER | Facility: CLINIC | Age: 74
End: 2024-11-05

## 2024-11-05 RX ORDER — VIT C/B6/B5/MAGNESIUM/HERB 173 50-5-6-5MG
500 CAPSULE ORAL DAILY
COMMUNITY

## 2024-11-05 RX ORDER — UREA 10 %
500 LOTION (ML) TOPICAL DAILY
COMMUNITY

## 2024-11-05 RX ORDER — ASCORBIC ACID 500 MG
500 TABLET ORAL DAILY
COMMUNITY

## 2024-11-05 ASSESSMENT — SOCIAL DETERMINANTS OF HEALTH (SDOH)
DO YOU BELONG TO ANY CLUBS OR ORGANIZATIONS SUCH AS CHURCH GROUPS UNIONS, FRATERNAL OR ATHLETIC GROUPS, OR SCHOOL GROUPS?: YES
HOW OFTEN DO YOU ATTEND CHURCH OR RELIGIOUS SERVICES?: MORE THAN 4 TIMES PER YEAR
HOW OFTEN DO YOU ATTENT MEETINGS OF THE CLUB OR ORGANIZATION YOU BELONG TO?: MORE THAN 4 TIMES PER YEAR

## 2024-11-05 NOTE — CARE COORDINATION
Ambulatory Care Coordination Note     2024 3:40 PM     Patient Current Location:  [place]     This patient was received as a referral from [facility] .    ACM contacted the patient by telephone. Verified name and  with patient as identifiers. Provided introduction to self, and explanation of the ACM role.   Patient accepted care management services at this time.          ACM: PARADISE HUDSON RN     Challenges to be reviewed by the provider   Additional needs identified to be addressed with provider Yes  Patient needs referral to be [facility] to [facility]               Method of communication with provider: chart routing.    Utilization: N/A - Initial Call     Care Summary Note: Initial outreach to patient due to recent [facility] visit. Patient was referred to endocrinology for high calcium level. Patient stated that she has not yet scheduled an appointment due to endocrinology office did not receive the referral. Patient has not called again to schedule. ACM notified [facility] to ensure referral was sent. Patient preferred to call endocrine office herself to schedule an appointment. Medications reviewed. Patient is no longer taking calcium supplement due to high calcium level. ACM advised to check her multivitamin for calcium as well. Patient stated that she will stop taking until her calcium level is resolved.  Patient stated that she recently had back surgery and had home therapy. Patient stated that her rehab went well and her surgeon released her on 10/18/24. Patient stated that she returned to gym yesterday. Discussed limitations, patient stated that she has a 35 pound weight restriction. Patient also stated she would like to lose about 15 pounds. ACM will continue to follow up with patient to help reach her goal.  Patient lives with her  and stated that they try to stay active. Patient is independent with her care and household tasks. Patient attends a

## 2024-11-20 ENCOUNTER — CARE COORDINATION (OUTPATIENT)
Dept: OTHER | Facility: CLINIC | Age: 74
End: 2024-11-20

## 2024-11-20 NOTE — CARE COORDINATION
Ambulatory Care Coordination Note     2024 2:12 PM     Patient Current Location:  [facility] contacted the patient by telephone. Verified name and  with patient as identifiers.         ACM: PARADISE HUDSON RN     Challenges to be reviewed by the provider   Additional needs identified to be addressed with provider No  none               Method of communication with provider: none.    Utilization: Patient has not had any utilization since our last call.     Care Summary Note: Follow up call to patient. Patient stated that she is doing well. She has appointment scheduled with endocrine to follow up on elevated calcium level. Patient has been going to the gym and tries to complete cardio, weight resistance and spa 3 times per week. Patient also tries to follow a healthy diet due to high cholesterol and for weight loss. Patient tries to avoid fast food, processed foods and tries to eat a lot of vegetables. Encouraged patient to continue. Patient stated that her BP is well controlled, she does not take any meds for HTN and her BP in normally 110-125/70s.     Offered patient enrollment in the Remote Patient Monitoring (RPM) program for in-home monitoring: Patient is not eligible for RPM program because: affiliate provider.     Assessments Completed:   Care Coordination Interventions    Referral from Primary Care Provider: No  Suggested Interventions and Community Resources  Social Work: In Process (Comment: 24 patient is requesting assistance with completing ACP documents)  Other Services: In Process (Comment: referral to endocrinology for high calcium level)          Medications Reviewed:   Completed during a previous call     Advance Care Planning:   Not on file; education provided  Referral to internal ACP facilitator  Patient does not have ACP documents in place but would like to obtain. Agreeable to SW referral to complete documents. Patient requested that SW not call before next week. Patient

## 2024-11-22 ENCOUNTER — CARE COORDINATION (OUTPATIENT)
Dept: OTHER | Facility: CLINIC | Age: 74
End: 2024-11-22

## 2024-11-22 NOTE — CARE COORDINATION
The patient has been identified for outreach by this MSW to provide support and address specific needs. The outreach will focus on engaging the patient to ensure they have access to necessary resources and assistance.    Please reach out if you have any further questions or concerns!  VONDA Carty, [facility]  , [phone]

## 2024-11-27 ENCOUNTER — CARE COORDINATION (OUTPATIENT)
Dept: OTHER | Facility: CLINIC | Age: 74
End: 2024-11-27

## 2024-11-27 SDOH — SOCIAL STABILITY: SOCIAL NETWORK: HOW OFTEN DO YOU GET TOGETHER WITH FRIENDS OR RELATIVES?: TWICE A WEEK

## 2024-11-27 SDOH — HEALTH STABILITY: PHYSICAL HEALTH: ON AVERAGE, HOW MANY DAYS PER WEEK DO YOU ENGAGE IN MODERATE TO STRENUOUS EXERCISE (LIKE A BRISK WALK)?: 3 DAYS

## 2024-11-27 SDOH — SOCIAL STABILITY: SOCIAL NETWORK: IN A TYPICAL WEEK, HOW MANY TIMES DO YOU TALK ON THE PHONE WITH FAMILY, FRIENDS, OR NEIGHBORS?: TWICE A WEEK

## 2024-11-27 SDOH — HEALTH STABILITY: PHYSICAL HEALTH: ON AVERAGE, HOW MANY MINUTES DO YOU ENGAGE IN EXERCISE AT THIS LEVEL?: 120 MIN

## 2024-11-27 NOTE — CARE COORDINATION
Ambulatory Care Coordination Note     2024 10:39 AM     Patient Current Location:  [facility] contacted the patient by telephone. Verified name and  with patient as identifiers.         ACM: JIA RYAN RN     Challenges to be reviewed by the provider   Additional needs identified to be addressed with provider No  none               Method of communication with provider: none.    Utilization: Patient has not had any utilization since our last call.     Care Summary Note: Follow up call to patient. Patient stated that she is doing well. Patient stated that she is getting ready to go to the gym and only has a few minutes to talk. Briefly discussed Thanksgiving holiday. Patient stated that she plans to watch what she eats to continue with her diet plan.    Offered patient enrollment in the Remote Patient Monitoring (RPM) program for in-home monitoring: Patient is not eligible for RPM program because: affiliate provider.     Assessments Completed:   No changes since last call    Medications Reviewed:   Completed during a previous call     Advance Care Planning:   Reviewed during previous call      Care Planning:   Education Documentation  Lifestyle Changes/Goal Setting, taught by Jia Ryan RN at 2024 10:39 AM.  Learner: Patient  Readiness: Acceptance  Method: Explanation  Response: Verbalizes Understanding    Educate dietary adherence benefits, taught by Jia Ryan RN at 2024 10:39 AM.  Learner: Patient  Readiness: Acceptance  Method: Explanation  Response: Verbalizes Understanding    Education Comments  No comments found.     ,    Goals Addressed                   This Visit's Progress     Activity Plan   On track     I will increase my activity by Advised to engage in going to the gym for cardio, weight resistance and spa ,and frequency goal is  3 times a week    Barriers: time constraints  Plan for overcoming my barriers: work with ACM  Confidence: 9/10  Anticipated Goal

## 2024-11-27 NOTE — CARE COORDINATION
Care Coordination MSW Documentation    11/27/2024 1:13 PM    Outreach call to patient in follow-up to ACP Specialist referral from: Jia Ryan RN   requesting assistance with new advance directive completion    Next Step:    ACP scheduled conversation December 3rd, 2024 @ 9am.

## 2024-12-03 ENCOUNTER — CARE COORDINATION (OUTPATIENT)
Dept: OTHER | Facility: CLINIC | Age: 74
End: 2024-12-03

## 2024-12-03 NOTE — CARE COORDINATION
MSW made first attempted outreach call to patient in efforts to complete ACP today at 9am as agreed with the patient; this referral from: Jia Ryan RN requesting assistance with new advance directive completion. Unable to reach patient.    Outreach Attempts:   Multiple attempts to contact patient at phone numbers on file.     Follow Up:   Plan for next outreach in 1-2 days.

## 2024-12-05 ENCOUNTER — CARE COORDINATION (OUTPATIENT)
Dept: OTHER | Facility: CLINIC | Age: 74
End: 2024-12-05

## 2024-12-05 NOTE — CARE COORDINATION
Outreach call to patient in follow-up to ACP Specialist referral from: Jia Ryan, RN requesting assistance with new advance directive completion    The patient express apprehension in completing the ACP document and stated not having anyone in her life that she believes she can trust or trust to make an important decision for her. MSW validated her feelings and provided her the option of mailing the form and calling to discuss it further. Patient stated that gives her a higher level of comfort and that she would appreciate it. She doesn't feel comfortable even listing her daughter as an agent. MSW provided active listening and offer ongoing contact as needed. Patient agrees to plan.    Second Outreach:     Outreach call to patient in follow-up to ACP Specialist.     Next Step:    Mailed/Emailed Information Sheets  Mailed/Emailed Advance Directive  Outreach again in within the next 3 weeks.     VONDA Carty, [facility]   Care Coordinator, [phone]

## 2024-12-05 NOTE — CARE COORDINATION
Ambulatory Care Coordination Note     12/5/2024 1:23 PM     Patient outreach attempt by this ACM today to perform care management follow up . [facility] was unable to reach the patient by telephone today;   left voice message requesting a return phone call to this ACM.     ACM: PARADISE HUDSON RN         PCP/Specialist follow up:   Future Appointments         Provider Specialty Dept Phone    12/18/2024 2:00 PM Danelle Aragon MD Endocrinology [phone]            Follow Up:   Plan for next AC outreach in approximately 2 weeks to complete:  - SDOH assessments  - follow-up appointment with endocrinologist .

## 2024-12-18 ENCOUNTER — OFFICE VISIT (OUTPATIENT)
Age: 74
End: 2024-12-18
Payer: MEDICARE

## 2024-12-18 VITALS
DIASTOLIC BLOOD PRESSURE: 67 MMHG | BODY MASS INDEX: 32.66 KG/M2 | HEIGHT: 61 IN | RESPIRATION RATE: 18 BRPM | WEIGHT: 173 LBS | TEMPERATURE: 98.6 F | OXYGEN SATURATION: 97 % | HEART RATE: 76 BPM | SYSTOLIC BLOOD PRESSURE: 112 MMHG

## 2024-12-18 DIAGNOSIS — E83.52 HYPERCALCEMIA: Primary | ICD-10-CM

## 2024-12-18 DIAGNOSIS — E78.2 MIXED HYPERLIPIDEMIA: ICD-10-CM

## 2024-12-18 DIAGNOSIS — E55.9 VITAMIN D DEFICIENCY: ICD-10-CM

## 2024-12-18 PROCEDURE — G2211 COMPLEX E/M VISIT ADD ON: HCPCS | Performed by: INTERNAL MEDICINE

## 2024-12-18 PROCEDURE — 1090F PRES/ABSN URINE INCON ASSESS: CPT | Performed by: INTERNAL MEDICINE

## 2024-12-18 PROCEDURE — G8417 CALC BMI ABV UP PARAM F/U: HCPCS | Performed by: INTERNAL MEDICINE

## 2024-12-18 PROCEDURE — 3017F COLORECTAL CA SCREEN DOC REV: CPT | Performed by: INTERNAL MEDICINE

## 2024-12-18 PROCEDURE — 1159F MED LIST DOCD IN RCRD: CPT | Performed by: INTERNAL MEDICINE

## 2024-12-18 PROCEDURE — G8427 DOCREV CUR MEDS BY ELIG CLIN: HCPCS | Performed by: INTERNAL MEDICINE

## 2024-12-18 PROCEDURE — G8484 FLU IMMUNIZE NO ADMIN: HCPCS | Performed by: INTERNAL MEDICINE

## 2024-12-18 PROCEDURE — 99204 OFFICE O/P NEW MOD 45 MIN: CPT | Performed by: INTERNAL MEDICINE

## 2024-12-18 PROCEDURE — 1123F ACP DISCUSS/DSCN MKR DOCD: CPT | Performed by: INTERNAL MEDICINE

## 2024-12-18 PROCEDURE — 1160F RVW MEDS BY RX/DR IN RCRD: CPT | Performed by: INTERNAL MEDICINE

## 2024-12-18 PROCEDURE — 1126F AMNT PAIN NOTED NONE PRSNT: CPT | Performed by: INTERNAL MEDICINE

## 2024-12-18 PROCEDURE — 3074F SYST BP LT 130 MM HG: CPT | Performed by: INTERNAL MEDICINE

## 2024-12-18 PROCEDURE — 1036F TOBACCO NON-USER: CPT | Performed by: INTERNAL MEDICINE

## 2024-12-18 PROCEDURE — 3078F DIAST BP <80 MM HG: CPT | Performed by: INTERNAL MEDICINE

## 2024-12-18 PROCEDURE — G8400 PT W/DXA NO RESULTS DOC: HCPCS | Performed by: INTERNAL MEDICINE

## 2024-12-18 NOTE — PROGRESS NOTES
[facility] DIABETES AND ENDOCRINOLOGY               Danelle Aragon MD          Patient Information  Name : Mehreen Sorenson 74 y.o.     YOB: 1950         Referred by: Willow Ricketts MD       Chief Complaint   Patient presents with    New Patient     Referred for Hypercalcemia       History of Present Illness  74-year-old female presents for evaluation of hypercalcemia. Informed of elevated calcium levels during last two checkups this year. Discontinued calcium supplements 7 months ago. Diet rich in dark leafy vegetables, no Tums, inadequate water intake except on exercise days (3 days/week), consumes purified water, fasting on blood test day. No history of kidney stones, stomach ulcers, acid reflux, thyroid issues, or neck lumps. Chronic constipation managed with Linzess for 1.5 years.     History of foot fracture 6 years ago, normal bone density.    Last bone density scan 4 years ago, normal.   On omeprazole due to daily aspirin intake. No diuretics. Intentionally lost 15 pounds for surgery, aiming for 150 pounds.    FAMILY HISTORY  - No family history of kidney stones or calcium problems    MEDICATIONS  Current: omeprazole, aspirin, Linzess, multivitamin, vitamin D.      Lost weight intentional     Foot fracture - trauma     Past Medical History:   Diagnosis Date    Arthritis     Constipation     Hyperlipidemia     ANDREI (obstructive sleep apnea)     diagnosed around 2005, mild but not using CPAP    PCOS (polycystic ovarian syndrome)     Spinal stenosis, lumbar     Spondylolisthesis, grade 1        Current Outpatient Medications   Medication Sig    vitamin B-12 (CYANOCOBALAMIN) 500 MCG tablet Take 1 tablet by mouth daily    vitamin C (ASCORBIC ACID) 500 MG tablet Take 1 tablet by mouth daily    turmeric (QC TUMERIC COMPLEX) 500 MG CAPS Take 1 capsule by mouth daily    linaclotide (LINZESS) 145 MCG capsule Take 1 capsule by mouth as needed    vitamin D 25 MCG (1000 UT) CAPS Take by mouth daily

## 2024-12-18 NOTE — PROGRESS NOTES
Mehreen Sorenson is a 74 y.o. female here for   Chief Complaint   Patient presents with    New Patient     Referred for Hypercalcemia       1. Have you been to the ER, urgent care clinic since your last visit?  Hospitalized since your last visit? -n/a    2. Have you seen or consulted any other health care providers outside of the [facility] since your last visit?  Include any pap smears or colon screening.-n/a

## 2024-12-19 ENCOUNTER — CARE COORDINATION (OUTPATIENT)
Dept: OTHER | Facility: CLINIC | Age: 74
End: 2024-12-19

## 2024-12-19 SDOH — SOCIAL STABILITY: SOCIAL INSECURITY
WITHIN THE LAST YEAR, HAVE YOU BEEN KICKED, HIT, SLAPPED, OR OTHERWISE PHYSICALLY HURT BY YOUR PARTNER OR EX-PARTNER?: NO

## 2024-12-19 SDOH — SOCIAL STABILITY: SOCIAL INSECURITY
WITHIN THE LAST YEAR, HAVE TO BEEN RAPED OR FORCED TO HAVE ANY KIND OF SEXUAL ACTIVITY BY YOUR PARTNER OR EX-PARTNER?: NO

## 2024-12-19 SDOH — HEALTH STABILITY: MENTAL HEALTH: HOW MANY STANDARD DRINKS CONTAINING ALCOHOL DO YOU HAVE ON A TYPICAL DAY?: 1 OR 2

## 2024-12-19 SDOH — SOCIAL STABILITY: SOCIAL INSECURITY: WITHIN THE LAST YEAR, HAVE YOU BEEN AFRAID OF YOUR PARTNER OR EX-PARTNER?: NO

## 2024-12-19 SDOH — ECONOMIC STABILITY: INCOME INSECURITY: IN THE LAST 12 MONTHS, WAS THERE A TIME WHEN YOU WERE NOT ABLE TO PAY THE MORTGAGE OR RENT ON TIME?: NO

## 2024-12-19 SDOH — HEALTH STABILITY: MENTAL HEALTH: HOW OFTEN DO YOU HAVE A DRINK CONTAINING ALCOHOL?: 2-4 TIMES A MONTH

## 2024-12-19 SDOH — HEALTH STABILITY: MENTAL HEALTH
STRESS IS WHEN SOMEONE FEELS TENSE, NERVOUS, ANXIOUS, OR CAN'T SLEEP AT NIGHT BECAUSE THEIR MIND IS TROUBLED. HOW STRESSED ARE YOU?: NOT AT ALL

## 2024-12-19 SDOH — ECONOMIC STABILITY: FOOD INSECURITY: WITHIN THE PAST 12 MONTHS, THE FOOD YOU BOUGHT JUST DIDN'T LAST AND YOU DIDN'T HAVE MONEY TO GET MORE.: NEVER TRUE

## 2024-12-19 SDOH — ECONOMIC STABILITY: INCOME INSECURITY: HOW HARD IS IT FOR YOU TO PAY FOR THE VERY BASICS LIKE FOOD, HOUSING, MEDICAL CARE, AND HEATING?: NOT HARD AT ALL

## 2024-12-19 SDOH — SOCIAL STABILITY: SOCIAL INSECURITY: WITHIN THE LAST YEAR, HAVE YOU BEEN HUMILIATED OR EMOTIONALLY ABUSED IN OTHER WAYS BY YOUR PARTNER OR EX-PARTNER?: NO

## 2024-12-19 SDOH — ECONOMIC STABILITY: FOOD INSECURITY: WITHIN THE PAST 12 MONTHS, YOU WORRIED THAT YOUR FOOD WOULD RUN OUT BEFORE YOU GOT MONEY TO BUY MORE.: NEVER TRUE

## 2024-12-19 SDOH — ECONOMIC STABILITY: TRANSPORTATION INSECURITY
IN THE PAST 12 MONTHS, HAS THE LACK OF TRANSPORTATION KEPT YOU FROM MEDICAL APPOINTMENTS OR FROM GETTING MEDICATIONS?: NO

## 2024-12-19 NOTE — CARE COORDINATION
Ambulatory Care Coordination Note     2024 2:29 PM     Patient Current Location:  [facility] contacted the patient by telephone. Verified name and  with patient as identifiers.         ACM: PARADISE HUDSON RN     Challenges to be reviewed by the provider   Additional needs identified to be addressed with provider No  none               Method of communication with provider: none.    Utilization: Patient has not had any utilization since our last call.     Care Summary Note: Follow up call to patient. Patient attended appointment with endocrinologist yesterday for hypercalcemia. Potential causes are dehydration and issues with parathyroid. Patient will have more lab work completed and has a follow up appointment in 2 months. Patient stated that endocrinologist encouraged patient to continue eating foods high in calcium and to drink more water. Discussed overall health goals and making health a priority. Patient sees dentist regularly and continues to go to the gym. Patient takes medications as directed and follows a healthy diet for weight loss. Encouraged patient to continue with current regimen.     Offered patient enrollment in the Remote Patient Monitoring (RPM) program for in-home monitoring: Patient is not eligible for RPM program because: affiliate provider.     Assessments Completed:   Care Coordination Interventions    Referral from Primary Care Provider: No  Suggested Interventions and Community Resources  Social Work: Completed (Comment: 24 patient is requesting assistance with completing ACP documents)  Other Services: Completed (Comment: referral to endocrinology for high calcium level)      ,   Social Determinants of Health     Tobacco Use: Medium Risk (2024)    Patient History     Smoking Tobacco Use: Former     Smokeless Tobacco Use: Never     Passive Exposure: Not on file   Alcohol Use: Not At Risk (2024)    AUDIT-C     Frequency of Alcohol Consumption: 2-4 times a

## 2025-01-03 ENCOUNTER — CARE COORDINATION (OUTPATIENT)
Dept: OTHER | Facility: CLINIC | Age: 75
End: 2025-01-03

## 2025-01-03 NOTE — CARE COORDINATION
MSW forwarded the patient to [facility] for ACP mail out.      MSW will follow up with patient within a few weeks to see if ACP was received.

## 2025-01-07 ENCOUNTER — CARE COORDINATION (OUTPATIENT)
Dept: OTHER | Facility: CLINIC | Age: 75
End: 2025-01-07

## 2025-01-07 NOTE — CARE COORDINATION
Ambulatory Care Coordination Note     2025 2:00 PM     Patient Current Location:  [facility] contacted the patient by telephone. Verified name and  with patient as identifiers.         ACM: PARADISE HUDSON RN     Challenges to be reviewed by the provider   Additional needs identified to be addressed with provider No  none               Method of communication with provider: none.    Utilization: Patient has not had any utilization since our last call.     Care Summary Note: Follow up call to patient. Patient stated that she is doing well and enjoyed the holidays. Reviewed patient's appointment with endocrinologist. Patient has follow up appointment in February. Patient plans to have lab work done this week, weather permitting. Patient is working on increasing her calcium intake by eating dark green vegetables, yogurt, cheese and eggs. Patient overall follows a healthy diet and limits her intake of processed foods and sugar. Patient uses Splenda in her coffee and asked if that is okay. Discussed pros and cons of artificial sweeteners and using in moderation. Patient is also trying to stay hydrated by drinking more water. Patient continues to go to the gym. Patient has increased her repetitions in her exercises. Patient has not increased weight resistance per previous recommendations from therapy after having back surgery. Patient has lost 15 pounds and has a goal to lose an additional 15 pounds. Discussed the benefits of healthy diet and exercise. Patient stated that she has not had any back issues and her BP has been good with most recent reading 112/58.     Offered patient enrollment in the Remote Patient Monitoring (RPM) program for in-home monitoring: Patient is not eligible for RPM program because: affiliate provider.     Assessments Completed:   No changes since last call    Medications Reviewed:   Completed during a previous call     Advance Care Planning:   Reviewed during previous call      Care

## 2025-01-21 ENCOUNTER — CARE COORDINATION (OUTPATIENT)
Dept: OTHER | Facility: CLINIC | Age: 75
End: 2025-01-21

## 2025-01-21 NOTE — CARE COORDINATION
Ambulatory Care Coordination Note     1/21/2025 1:45 PM     Patient outreach attempt by this ACM today to perform care management follow up . ACM was unable to reach the patient by telephone today;   left voice message requesting a return phone call to this ACM.     ACM: PARADISE HUDSON RN         PCP/Specialist follow up:   Future Appointments         Provider Specialty Dept Phone    2/18/2025 10:00 AM Danelle rAagon MD Endocrinology [phone]            Follow Up:   Plan for next ACM outreach in approximately 2 weeks to complete:  - goal progression.

## 2025-01-25 LAB
25(OH)D3+25(OH)D2 SERPL-MCNC: 40.7 NG/ML (ref 30–100)
ALBUMIN SERPL-MCNC: 4.5 G/DL (ref 3.8–4.8)
ALP SERPL-CCNC: 108 IU/L (ref 44–121)
ALT SERPL-CCNC: 25 IU/L (ref 0–32)
AST SERPL-CCNC: 40 IU/L (ref 0–40)
BILIRUB SERPL-MCNC: 0.6 MG/DL (ref 0–1.2)
BUN SERPL-MCNC: 7 MG/DL (ref 8–27)
BUN/CREAT SERPL: 10 (ref 12–28)
CALCIUM SERPL-MCNC: 10.6 MG/DL (ref 8.7–10.3)
CHLORIDE SERPL-SCNC: 108 MMOL/L (ref 96–106)
CO2 SERPL-SCNC: 21 MMOL/L (ref 20–29)
CREAT SERPL-MCNC: 0.67 MG/DL (ref 0.57–1)
EGFRCR SERPLBLD CKD-EPI 2021: 92 ML/MIN/1.73
GLOBULIN SER CALC-MCNC: 2.4 G/DL (ref 1.5–4.5)
GLUCOSE SERPL-MCNC: 84 MG/DL (ref 70–99)
MAGNESIUM SERPL-MCNC: 2.1 MG/DL (ref 1.6–2.3)
PHOSPHATE SERPL-MCNC: 2.7 MG/DL (ref 3–4.3)
POTASSIUM SERPL-SCNC: 4.3 MMOL/L (ref 3.5–5.2)
PROT SERPL-MCNC: 6.9 G/DL (ref 6–8.5)
PTH-INTACT SERPL-MCNC: 109 PG/ML (ref 15–65)
SODIUM SERPL-SCNC: 145 MMOL/L (ref 134–144)
T4 FREE SERPL-MCNC: 1.04 NG/DL (ref 0.82–1.77)
TSH SERPL DL<=0.005 MIU/L-ACNC: 0.81 UIU/ML (ref 0.45–4.5)

## 2025-02-03 ENCOUNTER — CARE COORDINATION (OUTPATIENT)
Dept: OTHER | Facility: CLINIC | Age: 75
End: 2025-02-03

## 2025-02-03 NOTE — CARE COORDINATION
Advance Care Planning   The patient has the following advanced directives on file:  Advance Directives       Power of  Living Will ACP-Advance Directive ACP-Power of     Not on File Not on File Not on File Not on File            The patient has appointed the following active healthcare agents:    Primary Decision Maker: BRIANNA DAY - Spouse - [phone]    The Patient has the following current code status:    Code Status: Prior    Coordination  mailed out the ACP documents along with a letter containing instructions for completion. MSW will follow up as needed to ensure the patient receives the materials and understands the next steps.      Mark Anthony Ferguson LCSW

## 2025-02-05 ENCOUNTER — CARE COORDINATION (OUTPATIENT)
Dept: OTHER | Facility: CLINIC | Age: 75
End: 2025-02-05

## 2025-02-05 NOTE — CARE COORDINATION
Ambulatory Care Coordination Note     2/5/2025 2:16 PM     Patient outreach attempt by this ACM today to perform care management follow up . ACM was unable to reach the patient by telephone today;   left voice message requesting a return phone call to this ACM.     ACM: PARADISE HUDSON RN         PCP/Specialist follow up:   Future Appointments         Provider Specialty Dept Phone    2/18/2025 10:00 AM Danelle Aragon MD Endocrinology [phone]            Follow Up:   Plan for next ACM outreach in approximately 1 week to complete:  - goal progression  - follow-up appointment with PCP at [facility] .

## 2025-02-12 ENCOUNTER — CARE COORDINATION (OUTPATIENT)
Dept: OTHER | Facility: CLINIC | Age: 75
End: 2025-02-12

## 2025-02-12 NOTE — CARE COORDINATION
Ambulatory Care Coordination Note     2025 2:31 PM     Patient Current Location:  [facility] contacted the patient by telephone. Verified name and  with patient as identifiers.         ACM: PARADISE HUDSON RN     Challenges to be reviewed by the provider   Additional needs identified to be addressed with provider No  none               Method of communication with provider: none.    Utilization: Patient has not had any utilization since our last call.     Care Summary Note: Follow up call to patient. Patient stated that she is doing well and has no complaints. Discussed recent PCP at [facility]. Patient stated that she fainted at home. Patient stated that tests completed at [facility] indicated no stroke or MI. Discussed referral to cardiology. Patient stated that she forgot about referral because she is feeling better. Patient stated that she sees Dr. Jiménez for cardiology and will call to schedule an appointment. Discussed upcoming appointment with endocrinology, patient plans to attend appointment. Patient also reported that she had some issues with diarrhea for which she took Pepto Bismol, patient then developed black stools and scheduled a GI appointment. Patient discussed symptoms with PA at GI office and side effect of Pepto Bismol and appointment was cancelled. Patient has had a colonoscopy in the last few years with normal results.  Patient stated that received ACP documents in the mail sent by . Patient stated that she is reviewing the documents. Patient has concerns about who to choose as her decision maker stating she might want to choose a non-relative. Reviewed that if no documents are in place that her spouse would be her decision maker. Patient stated that she is aware. Patient stated that she has always been independent and has been making her own decisions so it is difficult to have someone else do that for her. Discussed importance of choosing someone that she trusts to

## 2025-02-13 PROBLEM — M43.10 ACQUIRED SPONDYLOLISTHESIS: Status: ACTIVE | Noted: 2023-03-21

## 2025-02-13 PROBLEM — E83.52 HYPERCALCEMIA: Status: ACTIVE | Noted: 2025-02-13

## 2025-02-13 PROBLEM — I25.9 CHRONIC ISCHEMIC HEART DISEASE: Status: ACTIVE | Noted: 2024-10-21

## 2025-02-13 PROBLEM — M51.26 DISPLACEMENT OF LUMBAR INTERVERTEBRAL DISC WITHOUT MYELOPATHY: Status: ACTIVE | Noted: 2021-07-02

## 2025-02-18 ENCOUNTER — OFFICE VISIT (OUTPATIENT)
Age: 75
End: 2025-02-18
Payer: MEDICARE

## 2025-02-18 VITALS
SYSTOLIC BLOOD PRESSURE: 108 MMHG | BODY MASS INDEX: 32.57 KG/M2 | HEIGHT: 61 IN | HEART RATE: 77 BPM | WEIGHT: 172.5 LBS | OXYGEN SATURATION: 100 % | DIASTOLIC BLOOD PRESSURE: 68 MMHG | TEMPERATURE: 98 F

## 2025-02-18 DIAGNOSIS — E55.9 VITAMIN D DEFICIENCY: ICD-10-CM

## 2025-02-18 DIAGNOSIS — E83.52 HYPERCALCEMIA: Primary | ICD-10-CM

## 2025-02-18 DIAGNOSIS — E21.0 PRIMARY HYPERPARATHYROIDISM: ICD-10-CM

## 2025-02-18 DIAGNOSIS — E21.3 HYPERPARATHYROIDISM, UNSPECIFIED: ICD-10-CM

## 2025-02-18 PROCEDURE — 99214 OFFICE O/P EST MOD 30 MIN: CPT | Performed by: INTERNAL MEDICINE

## 2025-02-18 NOTE — PATIENT INSTRUCTIONS
I have ordered scan/test and if you do not hear from the hospital in 3- 4 business days  please call the number [phone] to speak with the scheduling team directly.

## 2025-02-18 NOTE — PROGRESS NOTES
Mehreen Sorenson is a 74 y.o. female here for   Chief Complaint   Patient presents with    High Calcium       1. Have you been to the ER, urgent care clinic since your last visit?  Hospitalized since your last visit? -No    2. Have you seen or consulted any other health care providers outside of the [facility] since your last visit?  Include any pap smears or colon screening.-No

## 2025-02-18 NOTE — PROGRESS NOTES
[facility] DIABETES AND ENDOCRINOLOGY               Danelle Aragon MD          Patient Information  Name : Mehreen Sorenson 74 y.o.     YOB: 1950         Referred by: Willow Ricketts MD       Chief Complaint   Patient presents with    High Calcium       History of Present Illness    74-year-old female presents for hypercalcemia  . No history of nephrolithiasis or recent fractures. Reports nocturia but not daytime frequency. Last bone density scan 4 years ago. Discontinued multivitamins several months ago. 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    FAMILY HISTORY  - No known family history of osteoporosis    MEDICATIONS  Current: rosuvastatin. Discontinued: multivitamin.      Lost weight intentional     Foot fracture - trauma     Past Medical History:   Diagnosis Date    Arthritis     Constipation     Hyperlipidemia     ANDREI (obstructive sleep apnea)     diagnosed around 2005, mild but not using CPAP    PCOS (polycystic ovarian syndrome)     Spinal stenosis, lumbar     Spondylolisthesis, grade 1        Current Outpatient Medications   Medication Sig    vitamin B-12 (CYANOCOBALAMIN) 500 MCG tablet Take 1 tablet by mouth daily    vitamin C (ASCORBIC ACID) 500 MG tablet Take 1 tablet by mouth daily    linaclotide (LINZESS) 145 MCG capsule Take 1 capsule by mouth as needed    vitamin D 25 MCG (1000 UT) CAPS Take by mouth daily    Multiple Vitamins-Minerals (THERAPEUTIC MULTIVITAMIN-MINERALS) tablet Take 1 tablet by mouth daily    omeprazole (PRILOSEC) 20 MG delayed release capsule Take 1 capsule by mouth daily    aspirin 81 MG EC tablet Take 1 tablet by mouth daily    ezetimibe (ZETIA) 10 MG tablet Take 1 tablet by mouth daily    atorvastatin (LIPITOR) 80 MG tablet Take 1 tablet by mouth daily    turmeric (QC TUMERIC COMPLEX) 500 MG CAPS Take 1 capsule by mouth daily (Patient not taking: Reported on 2/18/2025)    sennosides-docusate sodium (SENOKOT-S) 8.6-50 MG tablet Take 1 tablet by mouth 2 times

## 2025-02-26 ENCOUNTER — CARE COORDINATION (OUTPATIENT)
Dept: OTHER | Facility: CLINIC | Age: 75
End: 2025-02-26

## 2025-02-26 NOTE — CARE COORDINATION
Ambulatory Care Coordination Note     2/26/2025 1:13 PM     Patient outreach attempt by this ACM today to perform care management follow up . ACM was unable to reach the patient by telephone today;   left voice message requesting a return phone call to this ACM.     ACM: PARADISE HUDSON RN         PCP/Specialist follow up:   Future Appointments         Provider Specialty Dept Phone    8/12/2025 10:30 AM [facility] Endocrinology [phone]    8/19/2025 10:30 AM Danelle Aragon MD Endocrinology [phone]            Follow Up:   Plan for next AC outreach in approximately 1 week to complete:  - follow-up appointment with endocrinology .

## 2025-03-04 ENCOUNTER — CARE COORDINATION (OUTPATIENT)
Dept: OTHER | Facility: CLINIC | Age: 75
End: 2025-03-04

## 2025-03-04 NOTE — CARE COORDINATION
Social Work Care Coordination Note      Patient Current Location: [place]    Summary of Interaction:  MSW contacted the patient by telephone. Patient's identity was verified using name and date of birth as identifiers.  The patient stated she has received ACP and still apprehensive on completing it and doesn't know who to select as an agent. MSW learned that the patient has no further needs and will close Social Work Program.    Care Management Update:  Patient graduated from the Social Work program on 3/04/2025.           .  Patient currently demonstrates the ability to self-manage care needs.  Resources and supports provided during the care transition period were reinforced during the call.  Plan:  Care management goals have been successfully completed.  No further follow-up with the Care Coordinator Social Work is scheduled at this time.      VONDA Carty, [facility]   Care Coordinator, [phone]

## 2025-03-05 ENCOUNTER — CARE COORDINATION (OUTPATIENT)
Dept: OTHER | Facility: CLINIC | Age: 75
End: 2025-03-05

## 2025-03-05 NOTE — CARE COORDINATION
Ambulatory Care Coordination Note     3/5/2025 2:05 PM     Patient outreach attempt by this ACM today to perform care management follow up . ACM was unable to reach the patient by telephone today;   left voice message requesting a return phone call to this ACM.     ACM: PARADISE HUDSON RN         PCP/Specialist follow up:   Future Appointments         Provider Specialty Dept Phone    8/12/2025 10:30 AM [facility] Endocrinology [phone]    8/19/2025 10:30 AM Danelle Aragon MD Endocrinology [phone]            Follow Up:   Plan for next AC outreach in approximately 1 week to complete:  - follow-up appointment with endocrinologist  Discuss graduation .

## 2025-03-10 ENCOUNTER — TRANSCRIBE ORDERS (OUTPATIENT)
Facility: HOSPITAL | Age: 75
End: 2025-03-10

## 2025-03-10 DIAGNOSIS — Z12.31 ENCOUNTER FOR SCREENING MAMMOGRAM FOR BREAST CANCER: Primary | ICD-10-CM

## 2025-03-12 ENCOUNTER — CARE COORDINATION (OUTPATIENT)
Dept: OTHER | Facility: CLINIC | Age: 75
End: 2025-03-12

## 2025-03-12 NOTE — CARE COORDINATION
Ambulatory Care Coordination Note     3/12/2025 2:24 PM     patient outreach attempt by this ACM today to perform care management follow up . ACM was unable to reach the patient by telephone today;   left voice message requesting a return phone call to this ACM.     Patient closed (unable to reach patient) from the High Risk Care Management program on 3/12/2025.  No further Ambulatory Care Manager follow up scheduled.     Jia Ryan [phone]

## 2025-03-17 ENCOUNTER — HOSPITAL ENCOUNTER (OUTPATIENT)
Age: 75
Discharge: HOME | End: 2025-03-20
Payer: MEDICARE

## 2025-03-17 VITALS — WEIGHT: 167 LBS | BODY MASS INDEX: 31.53 KG/M2 | HEIGHT: 61 IN

## 2025-03-17 DIAGNOSIS — E21.3 HYPERPARATHYROIDISM, UNSPECIFIED: ICD-10-CM

## 2025-03-17 DIAGNOSIS — E21.0 PRIMARY HYPERPARATHYROIDISM: ICD-10-CM

## 2025-03-17 DIAGNOSIS — E55.9 VITAMIN D DEFICIENCY: ICD-10-CM

## 2025-03-17 DIAGNOSIS — E83.52 HYPERCALCEMIA: ICD-10-CM

## 2025-03-17 DIAGNOSIS — Z12.31 ENCOUNTER FOR SCREENING MAMMOGRAM FOR BREAST CANCER: ICD-10-CM

## 2025-03-17 PROCEDURE — 77080 DXA BONE DENSITY AXIAL: CPT

## 2025-03-17 PROCEDURE — 77067 SCR MAMMO BI INCL CAD: CPT

## 2025-03-22 ENCOUNTER — RESULTS FOLLOW-UP (OUTPATIENT)
Age: 75
End: 2025-03-22

## 2025-08-12 ENCOUNTER — LAB (OUTPATIENT)
Age: 75
End: 2025-08-12

## 2025-08-12 DIAGNOSIS — E55.9 VITAMIN D DEFICIENCY: ICD-10-CM

## 2025-08-12 DIAGNOSIS — E21.0 PRIMARY HYPERPARATHYROIDISM: ICD-10-CM

## 2025-08-13 LAB
25(OH)D3 SERPL-MCNC: 38.1 NG/ML (ref 30–100)
ANION GAP SERPL CALC-SCNC: 7 MMOL/L (ref 2–14)
BUN SERPL-MCNC: 11 MG/DL (ref 8–23)
BUN/CREAT SERPL: 13 (ref 12–20)
CALCIUM SERPL-MCNC: 10.4 MG/DL (ref 8.8–10.2)
CHLORIDE SERPL-SCNC: 109 MMOL/L (ref 98–107)
CO2 SERPL-SCNC: 26 MMOL/L (ref 20–29)
CREAT SERPL-MCNC: 0.81 MG/DL (ref 0.6–1)
GLUCOSE SERPL-MCNC: 83 MG/DL (ref 65–100)
POTASSIUM SERPL-SCNC: 4.7 MMOL/L (ref 3.5–5.1)
SODIUM SERPL-SCNC: 143 MMOL/L (ref 136–145)

## 2025-08-14 PROBLEM — E21.0 PRIMARY HYPERPARATHYROIDISM: Status: ACTIVE | Noted: 2025-08-14

## 2025-08-14 PROBLEM — E55.9 VITAMIN D DEFICIENCY: Status: ACTIVE | Noted: 2025-08-14

## 2025-08-19 ENCOUNTER — OFFICE VISIT (OUTPATIENT)
Age: 75
End: 2025-08-19
Payer: MEDICARE

## 2025-08-19 VITALS
HEIGHT: 61 IN | OXYGEN SATURATION: 99 % | HEART RATE: 63 BPM | TEMPERATURE: 98.3 F | WEIGHT: 171.7 LBS | DIASTOLIC BLOOD PRESSURE: 66 MMHG | SYSTOLIC BLOOD PRESSURE: 119 MMHG | BODY MASS INDEX: 32.42 KG/M2

## 2025-08-19 DIAGNOSIS — E55.9 VITAMIN D DEFICIENCY: ICD-10-CM

## 2025-08-19 DIAGNOSIS — E83.52 HYPERCALCEMIA: Primary | ICD-10-CM

## 2025-08-19 DIAGNOSIS — E21.0 PRIMARY HYPERPARATHYROIDISM: ICD-10-CM

## 2025-08-19 PROCEDURE — 1159F MED LIST DOCD IN RCRD: CPT | Performed by: INTERNAL MEDICINE

## 2025-08-19 PROCEDURE — 1123F ACP DISCUSS/DSCN MKR DOCD: CPT | Performed by: INTERNAL MEDICINE

## 2025-08-19 PROCEDURE — G8417 CALC BMI ABV UP PARAM F/U: HCPCS | Performed by: INTERNAL MEDICINE

## 2025-08-19 PROCEDURE — 99214 OFFICE O/P EST MOD 30 MIN: CPT | Performed by: INTERNAL MEDICINE

## 2025-08-19 PROCEDURE — G8399 PT W/DXA RESULTS DOCUMENT: HCPCS | Performed by: INTERNAL MEDICINE

## 2025-08-19 PROCEDURE — 3074F SYST BP LT 130 MM HG: CPT | Performed by: INTERNAL MEDICINE

## 2025-08-19 PROCEDURE — 1126F AMNT PAIN NOTED NONE PRSNT: CPT | Performed by: INTERNAL MEDICINE

## 2025-08-19 PROCEDURE — 1160F RVW MEDS BY RX/DR IN RCRD: CPT | Performed by: INTERNAL MEDICINE

## 2025-08-19 PROCEDURE — 3078F DIAST BP <80 MM HG: CPT | Performed by: INTERNAL MEDICINE

## 2025-08-19 PROCEDURE — 1090F PRES/ABSN URINE INCON ASSESS: CPT | Performed by: INTERNAL MEDICINE

## 2025-08-19 PROCEDURE — G8427 DOCREV CUR MEDS BY ELIG CLIN: HCPCS | Performed by: INTERNAL MEDICINE

## 2025-08-19 PROCEDURE — 3017F COLORECTAL CA SCREEN DOC REV: CPT | Performed by: INTERNAL MEDICINE

## 2025-08-19 PROCEDURE — G2211 COMPLEX E/M VISIT ADD ON: HCPCS | Performed by: INTERNAL MEDICINE

## 2025-08-19 PROCEDURE — 1036F TOBACCO NON-USER: CPT | Performed by: INTERNAL MEDICINE

## (undated) DEVICE — FLOSEAL MATRIX IS INDICATED IN SURGICAL PROCEDURES (OTHER THAN IN OPHTHALMIC) AS AN ADJUNCT TO HEMOSTASIS WHEN CONTROL OF BLEEDING BY LIGATURE OR CONVENTIONALPROCEDURES IS INEFFECTIVE OR IMPRACTICAL.: Brand: FLOSEAL HEMOSTATIC MATRIX

## (undated) DEVICE — MAGNETIC INSTR DRAPE 20X16: Brand: MEDLINE INDUSTRIES, INC.

## (undated) DEVICE — Device

## (undated) DEVICE — TOOL MR8-14BA50 MR8 14CM BALL 5MM: Brand: MIDAS REX MR8

## (undated) DEVICE — TUBING IRD400 5PK IPC LOW PRO IRRIGATION: Brand: MIDAS REX®

## (undated) DEVICE — SOLUTION IRRIG 1000ML 0.9% SOD CHL USP POUR PLAS BTL

## (undated) DEVICE — SUTURE VICRYL + SZ 30 L18IN ABSRB UD CP2 L26MM 1/2 CIR REV

## (undated) DEVICE — 1LYRTR 16FR10ML 100%SILI SNAP: Brand: MEDLINE INDUSTRIES, INC.

## (undated) DEVICE — SPONGE,NEURO,0.5"X0.5",XR,STRL,10/PK: Brand: MEDLINE

## (undated) DEVICE — MARKER SURG PASS SPHR NDI

## (undated) DEVICE — MARKER,SKIN,WI/RULER AND LABELS: Brand: MEDLINE

## (undated) DEVICE — TUBING, SUCTION, 1/4" X 10', STRAIGHT: Brand: MEDLINE

## (undated) DEVICE — KIT JACK TBL PT CARE

## (undated) DEVICE — SNAP KOVER: Brand: UNBRANDED

## (undated) DEVICE — TRANSFER SET 3": Brand: MEDLINE INDUSTRIES, INC.

## (undated) DEVICE — GLOVE ORANGE PI 7 1/2   MSG9075

## (undated) DEVICE — SOLUTION IRRIG 1000ML STRL H2O USP PLAS POUR BTL

## (undated) DEVICE — 450 ML BOTTLE OF 0.05% CHLORHEXIDINE GLUCONATE IN 99.95% STERILE WATER FOR IRRIGATION, USP AND APPLICATOR.: Brand: IRRISEPT ANTIMICROBIAL WOUND LAVAGE

## (undated) DEVICE — LAMINECTOMY-MRMC: Brand: MEDLINE INDUSTRIES, INC.

## (undated) DEVICE — STERILE-Z SURGICAL PATIENT COVERS CLEAR POLYETHYLENE STERILE UNIVERSAL FIT 10 PER CASE: Brand: STERILE-Z

## (undated) DEVICE — SUTURE VICRYL + ABSRB L18IN 0 NDL L36MM OS6 VLT 1/2 CIR REV CUT

## (undated) DEVICE — SOLUTION SURG PREP 26 CC PURPREP

## (undated) DEVICE — HYPODERMIC SAFETY NEEDLE: Brand: MONOJECT